# Patient Record
Sex: FEMALE | Race: WHITE | NOT HISPANIC OR LATINO | Employment: FULL TIME | URBAN - METROPOLITAN AREA
[De-identification: names, ages, dates, MRNs, and addresses within clinical notes are randomized per-mention and may not be internally consistent; named-entity substitution may affect disease eponyms.]

---

## 2017-11-13 DIAGNOSIS — Z12.31 VISIT FOR SCREENING MAMMOGRAM: Primary | ICD-10-CM

## 2017-11-13 DIAGNOSIS — Z78.0 MENOPAUSE: Primary | ICD-10-CM

## 2017-12-01 ENCOUNTER — HOSPITAL ENCOUNTER (OUTPATIENT)
Dept: RADIOLOGY | Facility: HOSPITAL | Age: 67
Discharge: HOME | End: 2017-12-01
Attending: FAMILY MEDICINE
Payer: MEDICARE

## 2017-12-01 DIAGNOSIS — Z78.0 MENOPAUSE: ICD-10-CM

## 2017-12-01 DIAGNOSIS — Z12.31 VISIT FOR SCREENING MAMMOGRAM: ICD-10-CM

## 2017-12-01 PROCEDURE — 77080 DXA BONE DENSITY AXIAL: CPT | Mod: TC,PO

## 2017-12-01 PROCEDURE — 77067 SCR MAMMO BI INCL CAD: CPT | Mod: TC,PO

## 2018-10-03 DIAGNOSIS — K74.60 CIRRHOSIS: Primary | ICD-10-CM

## 2018-10-08 ENCOUNTER — HOSPITAL ENCOUNTER (OUTPATIENT)
Dept: RADIOLOGY | Facility: HOSPITAL | Age: 68
Discharge: HOME | End: 2018-10-08
Attending: NURSE PRACTITIONER
Payer: MEDICARE

## 2018-10-08 DIAGNOSIS — K74.60 CIRRHOSIS: ICD-10-CM

## 2018-10-08 PROCEDURE — 76700 US EXAM ABDOM COMPLETE: CPT | Mod: TC,PO

## 2018-11-07 DIAGNOSIS — Z12.39 SCREENING BREAST EXAMINATION: Primary | ICD-10-CM

## 2018-12-03 ENCOUNTER — HOSPITAL ENCOUNTER (OUTPATIENT)
Dept: RADIOLOGY | Facility: HOSPITAL | Age: 68
Discharge: HOME | End: 2018-12-03
Attending: FAMILY MEDICINE
Payer: MEDICARE

## 2018-12-03 DIAGNOSIS — Z12.39 SCREENING BREAST EXAMINATION: ICD-10-CM

## 2018-12-03 PROCEDURE — 77063 BREAST TOMOSYNTHESIS BI: CPT | Mod: TC,PO

## 2018-12-03 PROCEDURE — 77067 SCR MAMMO BI INCL CAD: CPT | Mod: TC,PO

## 2019-04-03 DIAGNOSIS — K74.60 CIRRHOSIS: ICD-10-CM

## 2019-04-03 DIAGNOSIS — Z86.19 HISTORY OF HEPATITIS C: Primary | ICD-10-CM

## 2019-05-02 ENCOUNTER — HOSPITAL ENCOUNTER (OUTPATIENT)
Dept: RADIOLOGY | Facility: HOSPITAL | Age: 69
Discharge: HOME | End: 2019-05-02
Attending: NURSE PRACTITIONER
Payer: MEDICARE

## 2019-05-02 DIAGNOSIS — Z86.19 HISTORY OF HEPATITIS C: ICD-10-CM

## 2019-05-02 DIAGNOSIS — K74.60 CIRRHOSIS: ICD-10-CM

## 2019-05-02 PROCEDURE — 76700 US EXAM ABDOM COMPLETE: CPT | Mod: TC,PO

## 2019-12-04 DIAGNOSIS — Z12.31 SCREENING MAMMOGRAM, ENCOUNTER FOR: Primary | ICD-10-CM

## 2019-12-11 ENCOUNTER — HOSPITAL ENCOUNTER (OUTPATIENT)
Dept: RADIOLOGY | Facility: HOSPITAL | Age: 69
Discharge: HOME | End: 2019-12-11
Attending: FAMILY MEDICINE
Payer: MEDICARE

## 2019-12-11 DIAGNOSIS — Z12.31 SCREENING MAMMOGRAM, ENCOUNTER FOR: ICD-10-CM

## 2019-12-11 PROCEDURE — 77063 BREAST TOMOSYNTHESIS BI: CPT | Mod: TC,PO

## 2021-02-04 ENCOUNTER — HOSPITAL ENCOUNTER (OUTPATIENT)
Dept: RADIOLOGY | Facility: HOSPITAL | Age: 71
Discharge: HOME | End: 2021-02-04
Attending: FAMILY MEDICINE
Payer: MEDICARE

## 2021-02-04 DIAGNOSIS — Z12.31 SCREENING MAMMOGRAM, ENCOUNTER FOR: ICD-10-CM

## 2021-02-04 PROCEDURE — 77067 SCR MAMMO BI INCL CAD: CPT | Mod: TC,PO

## 2021-02-09 ENCOUNTER — HOSPITAL ENCOUNTER (OUTPATIENT)
Dept: RADIOLOGY | Facility: HOSPITAL | Age: 71
Discharge: HOME | End: 2021-02-09
Attending: INTERNAL MEDICINE
Payer: MEDICARE

## 2021-02-09 DIAGNOSIS — K74.60 LIVER CIRRHOSIS: ICD-10-CM

## 2021-02-09 PROCEDURE — 76700 US EXAM ABDOM COMPLETE: CPT | Mod: TC,PO

## 2021-03-04 ENCOUNTER — HOSPITAL ENCOUNTER (OUTPATIENT)
Dept: RADIOLOGY | Facility: HOSPITAL | Age: 71
Discharge: HOME | End: 2021-03-04
Attending: FAMILY MEDICINE
Payer: MEDICARE

## 2021-03-04 DIAGNOSIS — R41.82 ALTERED MENTAL STATUS, UNSPECIFIED: ICD-10-CM

## 2021-03-04 PROCEDURE — 70450 CT HEAD/BRAIN W/O DYE: CPT | Mod: TC,PO

## 2022-03-04 ENCOUNTER — LAB VISIT (OUTPATIENT)
Dept: LAB | Facility: HOSPITAL | Age: 72
End: 2022-03-04
Attending: INTERNAL MEDICINE
Payer: MEDICARE

## 2022-03-04 DIAGNOSIS — K74.60 CIRRHOSIS OF LIVER: Primary | ICD-10-CM

## 2022-03-04 LAB
AFP SERPL-MCNC: 4.3 NG/ML (ref 0–8.4)
ALBUMIN SERPL BCP-MCNC: 3.9 G/DL (ref 3.5–5.2)
ALP SERPL-CCNC: 106 U/L (ref 38–126)
ALT SERPL W/O P-5'-P-CCNC: 21 U/L (ref 10–44)
ANION GAP SERPL CALC-SCNC: 7 MMOL/L (ref 8–16)
AST SERPL-CCNC: 26 U/L (ref 15–46)
BASOPHILS # BLD AUTO: 0.02 K/UL (ref 0–0.2)
BASOPHILS NFR BLD: 0.6 % (ref 0–1.9)
BILIRUB SERPL-MCNC: 0.9 MG/DL (ref 0.1–1)
CALCIUM SERPL-MCNC: 9.4 MG/DL (ref 8.7–10.5)
CHLORIDE SERPL-SCNC: 108 MMOL/L (ref 95–110)
CO2 SERPL-SCNC: 28 MMOL/L (ref 23–29)
CREAT SERPL-MCNC: 1.06 MG/DL (ref 0.5–1.4)
DIFFERENTIAL METHOD: ABNORMAL
EOSINOPHIL # BLD AUTO: 0.1 K/UL (ref 0–0.5)
EOSINOPHIL NFR BLD: 3.1 % (ref 0–8)
ERYTHROCYTE [DISTWIDTH] IN BLOOD BY AUTOMATED COUNT: 12.7 % (ref 11.5–14.5)
EST. GFR  (AFRICAN AMERICAN): >60 ML/MIN/1.73 M^2
EST. GFR  (NON AFRICAN AMERICAN): 53.3 ML/MIN/1.73 M^2
GLUCOSE SERPL-MCNC: 82 MG/DL (ref 70–110)
HCT VFR BLD AUTO: 38 % (ref 37–48.5)
HGB BLD-MCNC: 12.3 G/DL (ref 12–16)
IMM GRANULOCYTES # BLD AUTO: 0.02 K/UL (ref 0–0.04)
IMM GRANULOCYTES NFR BLD AUTO: 0.6 % (ref 0–0.5)
INR PPP: 1.1 (ref 0.8–1.2)
LYMPHOCYTES # BLD AUTO: 0.7 K/UL (ref 1–4.8)
LYMPHOCYTES NFR BLD: 21.4 % (ref 18–48)
MCH RBC QN AUTO: 31.1 PG (ref 27–31)
MCHC RBC AUTO-ENTMCNC: 32.4 G/DL (ref 32–36)
MCV RBC AUTO: 96 FL (ref 82–98)
MONOCYTES # BLD AUTO: 0.2 K/UL (ref 0.3–1)
MONOCYTES NFR BLD: 7.3 % (ref 4–15)
NEUTROPHILS # BLD AUTO: 2.2 K/UL (ref 1.8–7.7)
NEUTROPHILS NFR BLD: 67 % (ref 38–73)
NRBC BLD-RTO: 0 /100 WBC
PLATELET # BLD AUTO: 108 K/UL (ref 150–450)
PMV BLD AUTO: 9.7 FL (ref 9.2–12.9)
POTASSIUM SERPL-SCNC: 4.9 MMOL/L (ref 3.5–5.1)
PROT SERPL-MCNC: 7.1 G/DL (ref 6–8.4)
PROTHROMBIN TIME: 11.3 SEC (ref 9–12.5)
RBC # BLD AUTO: 3.96 M/UL (ref 4–5.4)
SODIUM SERPL-SCNC: 143 MMOL/L (ref 136–145)
UUN UR-MCNC: 27 MG/DL (ref 7–17)
WBC # BLD AUTO: 3.27 K/UL (ref 3.9–12.7)

## 2022-03-04 PROCEDURE — 85025 COMPLETE CBC W/AUTO DIFF WBC: CPT | Mod: PO | Performed by: INTERNAL MEDICINE

## 2022-03-04 PROCEDURE — 82105 ALPHA-FETOPROTEIN SERUM: CPT | Mod: PO | Performed by: INTERNAL MEDICINE

## 2022-03-04 PROCEDURE — 85610 PROTHROMBIN TIME: CPT | Mod: PO | Performed by: INTERNAL MEDICINE

## 2022-03-04 PROCEDURE — 36415 COLL VENOUS BLD VENIPUNCTURE: CPT | Mod: PO | Performed by: INTERNAL MEDICINE

## 2022-03-04 PROCEDURE — 80053 COMPREHEN METABOLIC PANEL: CPT | Mod: PO | Performed by: INTERNAL MEDICINE

## 2022-03-21 ENCOUNTER — HOSPITAL ENCOUNTER (OUTPATIENT)
Dept: RADIOLOGY | Facility: HOSPITAL | Age: 72
Discharge: HOME | End: 2022-03-21
Attending: INTERNAL MEDICINE
Payer: MEDICARE

## 2022-03-21 DIAGNOSIS — K74.60 LIVER CIRRHOSIS: ICD-10-CM

## 2022-03-21 PROCEDURE — 76700 US EXAM ABDOM COMPLETE: CPT | Mod: TC,PO

## 2022-04-14 DIAGNOSIS — R55 SYNCOPE: Primary | ICD-10-CM

## 2022-04-20 ENCOUNTER — HOSPITAL ENCOUNTER (OUTPATIENT)
Dept: RADIOLOGY | Facility: HOSPITAL | Age: 72
Discharge: HOME | End: 2022-04-20
Attending: FAMILY MEDICINE
Payer: MEDICARE

## 2022-04-20 DIAGNOSIS — R55 SYNCOPE: ICD-10-CM

## 2022-04-20 PROCEDURE — 70450 CT HEAD/BRAIN W/O DYE: CPT | Mod: TC,PO

## 2022-04-20 PROCEDURE — 93880 EXTRACRANIAL BILAT STUDY: CPT | Mod: TC,PO

## 2022-06-09 ENCOUNTER — HOSPITAL ENCOUNTER (OUTPATIENT)
Dept: RADIOLOGY | Facility: HOSPITAL | Age: 72
Discharge: HOME | End: 2022-06-09
Attending: FAMILY MEDICINE
Payer: MEDICARE

## 2022-06-09 DIAGNOSIS — Z12.31 ENCOUNTER FOR SCREENING MAMMOGRAM FOR MALIGNANT NEOPLASM OF BREAST: ICD-10-CM

## 2022-06-09 PROCEDURE — 77067 SCR MAMMO BI INCL CAD: CPT | Mod: TC,PO

## 2022-07-25 ENCOUNTER — HOSPITAL ENCOUNTER (EMERGENCY)
Facility: HOSPITAL | Age: 72
Discharge: HOME | End: 2022-07-25
Attending: EMERGENCY MEDICINE
Payer: MEDICARE

## 2022-07-25 VITALS
TEMPERATURE: 99 F | RESPIRATION RATE: 18 BRPM | OXYGEN SATURATION: 98 % | DIASTOLIC BLOOD PRESSURE: 72 MMHG | SYSTOLIC BLOOD PRESSURE: 148 MMHG | BODY MASS INDEX: 35.85 KG/M2 | HEIGHT: 64 IN | HEART RATE: 52 BPM | WEIGHT: 210 LBS

## 2022-07-25 DIAGNOSIS — R00.0 TACHYCARDIA: ICD-10-CM

## 2022-07-25 DIAGNOSIS — D73.89 LESION OF SPLEEN: ICD-10-CM

## 2022-07-25 DIAGNOSIS — R16.1 SPLENOMEGALY, NOT ELSEWHERE CLASSIFIED: ICD-10-CM

## 2022-07-25 DIAGNOSIS — E86.0 DEHYDRATION: Primary | ICD-10-CM

## 2022-07-25 DIAGNOSIS — R10.12 LUQ ABDOMINAL PAIN: ICD-10-CM

## 2022-07-25 PROBLEM — E78.5 HYPERLIPIDEMIA: Status: ACTIVE | Noted: 2022-05-04

## 2022-07-25 PROBLEM — K74.60 CIRRHOSIS OF LIVER: Status: ACTIVE | Noted: 2021-01-02

## 2022-07-25 PROBLEM — N18.30 STAGE 3 CHRONIC KIDNEY DISEASE: Status: ACTIVE | Noted: 2021-01-02

## 2022-07-25 PROBLEM — E11.9 TYPE 2 DIABETES MELLITUS: Status: ACTIVE | Noted: 2021-01-02

## 2022-07-25 PROBLEM — I48.91 ATRIAL FIBRILLATION: Status: ACTIVE | Noted: 2022-05-04

## 2022-07-25 PROBLEM — I10 ESSENTIAL HYPERTENSION: Status: ACTIVE | Noted: 2021-01-02

## 2022-07-25 LAB
ALBUMIN SERPL BCP-MCNC: 4.1 G/DL (ref 3.5–5.2)
ALLENS TEST: ABNORMAL
ALP SERPL-CCNC: 87 U/L (ref 38–126)
ALT SERPL W/O P-5'-P-CCNC: 19 U/L (ref 10–44)
ANION GAP SERPL CALC-SCNC: 7 MMOL/L (ref 8–16)
AST SERPL-CCNC: 29 U/L (ref 15–46)
B-OH-BUTYR BLD STRIP-SCNC: 0.6 MMOL/L (ref 0–0.5)
BASOPHILS # BLD AUTO: 0.01 K/UL (ref 0–0.2)
BASOPHILS NFR BLD: 0.2 % (ref 0–1.9)
BILIRUB SERPL-MCNC: 2.1 MG/DL (ref 0.1–1)
BILIRUB UR QL STRIP: ABNORMAL
CALCIUM SERPL-MCNC: 9 MG/DL (ref 8.7–10.5)
CHLORIDE SERPL-SCNC: 105 MMOL/L (ref 95–110)
CLARITY UR REFRACT.AUTO: ABNORMAL
CO2 SERPL-SCNC: 23 MMOL/L (ref 23–29)
COLOR UR AUTO: ABNORMAL
CREAT SERPL-MCNC: 1.23 MG/DL (ref 0.5–1.4)
DELSYS: ABNORMAL
DIFFERENTIAL METHOD: ABNORMAL
EOSINOPHIL # BLD AUTO: 0.1 K/UL (ref 0–0.5)
EOSINOPHIL NFR BLD: 1 % (ref 0–8)
ERYTHROCYTE [DISTWIDTH] IN BLOOD BY AUTOMATED COUNT: 12.5 % (ref 11.5–14.5)
EST. GFR  (AFRICAN AMERICAN): 51 ML/MIN/1.73 M^2
EST. GFR  (NON AFRICAN AMERICAN): 44.2 ML/MIN/1.73 M^2
GLUCOSE SERPL-MCNC: 169 MG/DL (ref 70–110)
GLUCOSE UR QL STRIP: NEGATIVE
HCO3 UR-SCNC: 25.2 MMOL/L (ref 24–28)
HCT VFR BLD AUTO: 37.5 % (ref 37–48.5)
HCT VFR BLD CALC: 36 %PCV (ref 36–54)
HGB BLD-MCNC: 12 G/DL
HGB BLD-MCNC: 12.5 G/DL (ref 12–16)
HGB UR QL STRIP: ABNORMAL
IMM GRANULOCYTES # BLD AUTO: 0.02 K/UL (ref 0–0.04)
IMM GRANULOCYTES NFR BLD AUTO: 0.3 % (ref 0–0.5)
KETONES UR QL STRIP: ABNORMAL
LEUKOCYTE ESTERASE UR QL STRIP: NEGATIVE
LIPASE SERPL-CCNC: 62 U/L (ref 23–300)
LYMPHOCYTES # BLD AUTO: 0.8 K/UL (ref 1–4.8)
LYMPHOCYTES NFR BLD: 14 % (ref 18–48)
MCH RBC QN AUTO: 31.6 PG (ref 27–31)
MCHC RBC AUTO-ENTMCNC: 33.3 G/DL (ref 32–36)
MCV RBC AUTO: 95 FL (ref 82–98)
MONOCYTES # BLD AUTO: 0.6 K/UL (ref 0.3–1)
MONOCYTES NFR BLD: 9.6 % (ref 4–15)
NEUTROPHILS # BLD AUTO: 4.3 K/UL (ref 1.8–7.7)
NEUTROPHILS NFR BLD: 74.9 % (ref 38–73)
NITRITE UR QL STRIP: NEGATIVE
NRBC BLD-RTO: 0 /100 WBC
PCO2 BLDA: 46 MMHG (ref 35–45)
PH SMN: 7.35 [PH] (ref 7.35–7.45)
PH UR STRIP: 6 [PH] (ref 5–8)
PLATELET # BLD AUTO: 86 K/UL (ref 150–450)
PMV BLD AUTO: 10.4 FL (ref 9.2–12.9)
PO2 BLDA: 19 MMHG (ref 40–60)
POC BE: 0 MMOL/L
POC SATURATED O2: 27 % (ref 95–100)
POC TCO2: 27 MMOL/L (ref 24–29)
POCT GLUCOSE: 163 MG/DL (ref 70–110)
POTASSIUM BLD-SCNC: 4.4 MMOL/L (ref 3.5–5.1)
POTASSIUM SERPL-SCNC: 5.4 MMOL/L (ref 3.5–5.1)
PROT SERPL-MCNC: 7.2 G/DL (ref 6–8.4)
PROT UR QL STRIP: ABNORMAL
RBC # BLD AUTO: 3.96 M/UL (ref 4–5.4)
SAMPLE: ABNORMAL
SITE: ABNORMAL
SODIUM BLD-SCNC: 139 MMOL/L (ref 136–145)
SODIUM SERPL-SCNC: 135 MMOL/L (ref 136–145)
SP GR UR STRIP: 1.02 (ref 1–1.03)
URN SPEC COLLECT METH UR: ABNORMAL
UROBILINOGEN UR STRIP-ACNC: >=8 EU/DL
UUN UR-MCNC: 28 MG/DL (ref 7–17)
WBC # BLD AUTO: 5.73 K/UL (ref 3.9–12.7)

## 2022-07-25 PROCEDURE — 85025 COMPLETE CBC W/AUTO DIFF WBC: CPT | Mod: ER | Performed by: NURSE PRACTITIONER

## 2022-07-25 PROCEDURE — 84295 ASSAY OF SERUM SODIUM: CPT | Mod: ER,91

## 2022-07-25 PROCEDURE — 96374 THER/PROPH/DIAG INJ IV PUSH: CPT | Mod: ER,59

## 2022-07-25 PROCEDURE — 63600175 PHARM REV CODE 636 W HCPCS: Mod: ER | Performed by: NURSE PRACTITIONER

## 2022-07-25 PROCEDURE — 82962 GLUCOSE BLOOD TEST: CPT | Mod: ER

## 2022-07-25 PROCEDURE — 80053 COMPREHEN METABOLIC PANEL: CPT | Mod: ER | Performed by: NURSE PRACTITIONER

## 2022-07-25 PROCEDURE — 99900035 HC TECH TIME PER 15 MIN (STAT): Mod: ER

## 2022-07-25 PROCEDURE — 93010 EKG 12-LEAD: ICD-10-PCS | Mod: ,,, | Performed by: INTERNAL MEDICINE

## 2022-07-25 PROCEDURE — 25500020 PHARM REV CODE 255: Mod: ER | Performed by: EMERGENCY MEDICINE

## 2022-07-25 PROCEDURE — 96361 HYDRATE IV INFUSION ADD-ON: CPT | Mod: ER

## 2022-07-25 PROCEDURE — 82330 ASSAY OF CALCIUM: CPT | Mod: ER

## 2022-07-25 PROCEDURE — 85014 HEMATOCRIT: CPT | Mod: ER,91

## 2022-07-25 PROCEDURE — 81003 URINALYSIS AUTO W/O SCOPE: CPT | Mod: ER | Performed by: NURSE PRACTITIONER

## 2022-07-25 PROCEDURE — 82010 KETONE BODYS QUAN: CPT | Mod: ER | Performed by: NURSE PRACTITIONER

## 2022-07-25 PROCEDURE — 84132 ASSAY OF SERUM POTASSIUM: CPT | Mod: ER

## 2022-07-25 PROCEDURE — 99285 EMERGENCY DEPT VISIT HI MDM: CPT | Mod: 25,ER

## 2022-07-25 PROCEDURE — 83690 ASSAY OF LIPASE: CPT | Mod: ER | Performed by: NURSE PRACTITIONER

## 2022-07-25 PROCEDURE — 25000003 PHARM REV CODE 250: Mod: ER | Performed by: NURSE PRACTITIONER

## 2022-07-25 PROCEDURE — 93010 ELECTROCARDIOGRAM REPORT: CPT | Mod: ,,, | Performed by: INTERNAL MEDICINE

## 2022-07-25 PROCEDURE — 93005 ELECTROCARDIOGRAM TRACING: CPT | Mod: ER

## 2022-07-25 RX ORDER — KETOROLAC TROMETHAMINE 30 MG/ML
10 INJECTION, SOLUTION INTRAMUSCULAR; INTRAVENOUS
Status: COMPLETED | OUTPATIENT
Start: 2022-07-25 | End: 2022-07-25

## 2022-07-25 RX ADMIN — SODIUM CHLORIDE 1000 ML: 0.9 INJECTION, SOLUTION INTRAVENOUS at 10:07

## 2022-07-25 RX ADMIN — IOHEXOL 100 ML: 350 INJECTION, SOLUTION INTRAVENOUS at 11:07

## 2022-07-25 RX ADMIN — KETOROLAC TROMETHAMINE 10 MG: 30 INJECTION, SOLUTION INTRAMUSCULAR; INTRAVENOUS at 11:07

## 2022-07-25 NOTE — DISCHARGE INSTRUCTIONS
Please have your primary care provider follow up on your lung nodule- the radiologist recommended a repeat CT in 4 months. Please increase your water intake as you were slightly dehydrated.

## 2022-07-25 NOTE — ED PROVIDER NOTES
Encounter Date: 7/25/2022       History     Chief Complaint   Patient presents with    Abdominal Pain     Pt reports pain to the lower left abdominal area. Pt denies nvd, pt denies pain or burning on urination. Pt states she has dark colored urine and a sweet scent to the urine. Pt states she is diabetic and her CBG was 120 this morning.      73 y/o female with afib, liver cirrhosis, HTN, DM, which presents to the ED with intermittent LUQ abdominal pain that began yesterday. Denies fever, N/V/D or any other symptoms.     The history is provided by the patient.     Review of patient's allergies indicates:   Allergen Reactions    Codeine Swelling    Penicillins Rash     History reviewed. No pertinent past medical history.  Past Surgical History:   Procedure Laterality Date    HYSTERECTOMY      OOPHORECTOMY       Family History   Problem Relation Age of Onset    Breast cancer Sister         Review of Systems   Constitutional: Negative for fever.   HENT: Negative for sore throat.    Respiratory: Negative for shortness of breath.    Cardiovascular: Negative for chest pain.   Gastrointestinal: Positive for abdominal pain. Negative for nausea.   Genitourinary: Negative for dysuria.   Musculoskeletal: Negative for back pain.   Skin: Negative for rash.   Neurological: Negative for weakness.   Hematological: Does not bruise/bleed easily.   All other systems reviewed and are negative.    Physical Exam     Initial Vitals [07/25/22 1011]   BP Pulse Resp Temp SpO2   (!) 137/95 (!) 115 18 99.2 °F (37.3 °C) 98 %      MAP       --         Physical Exam    Nursing note and vitals reviewed.  Constitutional: She appears well-developed and well-nourished. She is Obese .   HENT:   Head: Normocephalic and atraumatic.   Eyes: Conjunctivae and EOM are normal. Pupils are equal, round, and reactive to light.   Neck:   Normal range of motion.  Cardiovascular: Regular rhythm, normal heart sounds and intact distal pulses. Tachycardia  present.  Exam reveals no gallop and no friction rub.    No murmur heard.  Pulmonary/Chest: Breath sounds normal. No respiratory distress. She has no wheezes. She has no rhonchi. She has no rales. She exhibits no tenderness.   Abdominal: Abdomen is soft. Bowel sounds are normal. She exhibits no distension and no mass. There is abdominal tenderness. There is no rebound and no guarding.   Musculoskeletal:         General: No tenderness or edema. Normal range of motion.      Cervical back: Normal range of motion.     Neurological: She is alert and oriented to person, place, and time. She has normal strength. GCS score is 15. GCS eye subscore is 4. GCS verbal subscore is 5. GCS motor subscore is 6.   Skin: Skin is warm. Capillary refill takes less than 2 seconds.       ED Course   Procedures  Labs Reviewed   CBC W/ AUTO DIFFERENTIAL - Abnormal; Notable for the following components:       Result Value    RBC 3.96 (*)     MCH 31.6 (*)     Platelets 86 (*)     Lymph # 0.8 (*)     Gran % 74.9 (*)     Lymph % 14.0 (*)     All other components within normal limits   COMPREHENSIVE METABOLIC PANEL - Abnormal; Notable for the following components:    Sodium 135 (*)     Potassium 5.4 (*)     Glucose 169 (*)     BUN 28 (*)     Total Bilirubin 2.1 (*)     Anion Gap 7 (*)     eGFR if  51.0 (*)     eGFR if non  44.2 (*)     All other components within normal limits   URINALYSIS, REFLEX TO URINE CULTURE - Abnormal; Notable for the following components:    Color, UA Orange (*)     Appearance, UA Hazy (*)     Protein, UA Trace (*)     Ketones, UA Trace (*)     Bilirubin (UA) 1+ (*)     Occult Blood UA Trace (*)     Urobilinogen, UA >=8.0 (*)     All other components within normal limits    Narrative:     Preferred Collection Type->Urine, Clean Catch  Specimen Source->Urine  Collection Type->Urine, Clean Catch   BETA - HYDROXYBUTYRATE, SERUM - Abnormal; Notable for the following components:     Beta-Hydroxybutyrate 0.6 (*)     All other components within normal limits   POCT GLUCOSE - Abnormal; Notable for the following components:    POCT Glucose 163 (*)     All other components within normal limits   ISTAT PROCEDURE - Abnormal; Notable for the following components:    POC PH 7.346 (*)     POC PCO2 46.0 (*)     POC PO2 19 (*)     POC SATURATED O2 27 (*)     All other components within normal limits   LIPASE        ECG Results          EKG 12-lead (In process)  Result time 07/25/22 10:55:52    In process by Interface, Lab In [facility] (07/25/22 10:55:52)                 Narrative:    Test Reason : R00.0,    Vent. Rate : 112 BPM     Atrial Rate : 144 BPM     P-R Int : 000 ms          QRS Dur : 094 ms      QT Int : 348 ms       P-R-T Axes : 000 014 056 degrees     QTc Int : 475 ms    Atrial fibrillation with rapid ventricular response  Low voltage QRS  Abnormal ECG  No previous ECGs available    Referred By: [email or web address]   SELF           Confirmed By:                             Imaging Results          CT Abdomen Pelvis With Contrast (Final result)  Result time 07/25/22 12:16:49    Final result by NADEEM Vicente Sr., MD (07/25/22 12:16:49)                 Impression:      1. There is a 71 mm area of hypodensity in the anterior inferior aspect of the spleen.  There are mild inflammatory changes adjacent to the inferior aspect of the spleen.  Hence common infectious process cannot be excluded.  If additional imaging evaluation is clinically indicated, I recommend consideration of an MRI examination of the abdomen without and with IV contrast.  2. There is a 29 mm stone in the dependent portion of the gallbladder.  3. The liver has a nodular surface.  This is characteristic of hepatic cirrhosis.  4. There is an 8 mm noncalcified pulmonary nodule in the left lower lobe.  I recommend consideration of a follow-up CT examination of the thorax without IV contrast in 4 months.  5. There is a marked amount of  diverticulosis throughout the majority of the colon. There are findings characteristic of a moderate size diverticulum off of the left side of the 2nd portion of the duodenum.  There are findings characteristic of a small diverticulum off of the superior aspect of the 3rd portion of the duodenum.  All CT scans at this facility use dose modulation, iterative reconstruction, and/or weight base dosing when appropriate to reduce radiation dose when appropriate to reduce radiation dose to as low as reasonably achievable.      Electronically signed by: Jered Vicente MD  Date:    07/25/2022  Time:    12:16             Narrative:    EXAMINATION:  CT ABDOMEN PELVIS WITH CONTRAST    CLINICAL HISTORY:  LLQ abdominal pain;    TECHNIQUE:  Standard abdomen and pelvis CT protocol with IV contrast was performed.  100 mL of Omnipaque 350 contrast material was used for this examination.  There was no oral contrast administered.    COMPARISON:  An ultrasound examination of the abdomen performed on 03/21/2022.    FINDINGS:  Finding: The size of the heart is within normal limits.  There is an 8 mm noncalcified pulmonary nodule in the left lower lobe.  There are mild dependent atelectatic changes in both lungs.  There is no pneumothorax or pleural effusion.    The liver has a nodular surface.  There is a 29 mm stone in the dependent portion of the gallbladder.  There is moderate generalized atrophy of the pancreas.  There is a 71 mm area of hypodensity in the anterior inferior aspect of the spleen.  There are mild inflammatory changes adjacent to the inferior aspect of the spleen.  The adrenals and kidneys are normal in appearance. The ureters and the urinary bladder are normal in appearance.  The uterus is absent.  The appendix is normal in appearance.  There is a marked amount of diverticulosis throughout the majority of the colon.  There are findings characteristic of a moderate size diverticulum off of the left side of the 2nd  portion of the duodenum.  There are findings characteristic of a small diverticulum off of the superior aspect of the 3rd portion of the duodenum.  There is a tiny amount of free fluid within the pelvis.  There is no pneumoperitoneum.  There is a moderate amount of atherosclerosis.                                 Medications   sodium chloride 0.9% bolus 1,000 mL (0 mLs Intravenous Stopped 7/25/22 1155)   ketorolac injection 9.999 mg (9.999 mg Intravenous Given 7/25/22 1105)   iohexoL (OMNIPAQUE 350) injection 100 mL (100 mLs Intravenous Given 7/25/22 1150)     Medical Decision Making:   Initial Assessment:   71 y/o female which presents with LUQ abdominal pain that is sharp in nature and is intermittent. PT states the pain comes in waves and then goes away.   Differential Diagnosis:   Gastroenteritis, gastritis, ulcer, cholecystitis, gallstones, pancreatitis, ileus, small bowel obstruction, appendicitis, constipation  Clinical Tests:   Lab Tests: Reviewed and Ordered  The following lab test(s) were unremarkable: CBC and Lipase       <> Summary of Lab: Elevated bili- hx of cirrhosis  Radiological Study: Ordered and Reviewed  Medical Tests: Ordered and Reviewed  ED Management:  Pt examined and noted to have LUQ abdominal pain. Labs are unremarkable except for elevated bili but she has a hx of liver cirrhosis and does not have any RUQ abdominal pain. Dr. Ham with hospital medicine called and she did not feel that the CT results warranted admission. The patient did not have any other reason to be admitted. She was dehydrated on presentation and was given fluids which made her feel better. MRI abdomen ordered as an outpatient and the patient has been advised to follow up with her PCP. I have stress that if she begins to have N/V or worsening pain to please return immediately. Patient given strict return precautions and voiced understanding of all discharge instructions. Pt was stable at discharge.                  ED  Course as of 07/25/22 1614   Mon Jul 25, 2022   1019 BP(!): 137/95 [AT]   1019 Temp: 99.2 °F (37.3 °C) [AT]   1019 Temp src: Oral [AT]   1019 Pulse(!): 115 [AT]   1019 SpO2: 98 % [AT]   1019 Resp: 18 [AT]   1146 Beta-Hydroxybutyrate(!): 0.6 [AT]   1146 Sodium(!): 135 [AT]   1146 Glucose(!): 169 [AT]   1146 BUN(!): 28 [AT]   1146 BILIRUBIN TOTAL(!): 2.1 [AT]   1252 Spoke with Dr. Ham and she feels the patient can be followed up as outpatient [AT]   1321 POC Potassium: 4.4 [AT]   1321 POC PH(!): 7.346 [AT]      ED Course User Index  [AT] LUI Valencia             Clinical Impression:   Final diagnoses:  [R00.0] Tachycardia  [E86.0] Dehydration (Primary)  [R10.12] LUQ abdominal pain  [D73.89] Lesion of spleen  [R16.1] Splenomegaly, not elsewhere classified          ED Disposition Condition    Discharge Stable        ED Prescriptions     None        Follow-up Information     Follow up With Specialties Details Why Contact Info    Nahum Hull MD Family Medicine Schedule an appointment as soon as possible for a visit   [address] [facility]  [address]  [place] [address]  [phone]             LUI Valencia  07/25/22 1614

## 2022-07-26 ENCOUNTER — HOSPITAL ENCOUNTER (OUTPATIENT)
Dept: RADIOLOGY | Facility: HOSPITAL | Age: 72
Discharge: HOME | End: 2022-07-26
Attending: NURSE PRACTITIONER
Payer: MEDICARE

## 2022-07-26 ENCOUNTER — HOSPITAL ENCOUNTER (OUTPATIENT)
Facility: HOSPITAL | Age: 72
Discharge: HOME | End: 2022-07-27
Attending: EMERGENCY MEDICINE | Admitting: FAMILY MEDICINE
Payer: MEDICARE

## 2022-07-26 DIAGNOSIS — D73.89 LESION OF SPLEEN: ICD-10-CM

## 2022-07-26 DIAGNOSIS — S35.299A: ICD-10-CM

## 2022-07-26 DIAGNOSIS — I48.91 ATRIAL FIBRILLATION, UNSPECIFIED TYPE: ICD-10-CM

## 2022-07-26 DIAGNOSIS — I48.91 A-FIB: ICD-10-CM

## 2022-07-26 DIAGNOSIS — R16.1 SPLENOMEGALY, NOT ELSEWHERE CLASSIFIED: ICD-10-CM

## 2022-07-26 DIAGNOSIS — D73.5 SPLENIC INFARCT: Primary | ICD-10-CM

## 2022-07-26 PROBLEM — F41.9 ANXIETY: Status: ACTIVE | Noted: 2022-07-26

## 2022-07-26 PROBLEM — B19.20 HEPATITIS C: Status: ACTIVE | Noted: 2022-07-26

## 2022-07-26 LAB
ALBUMIN SERPL BCP-MCNC: 3.2 G/DL (ref 3.5–5.2)
ALP SERPL-CCNC: 103 U/L (ref 55–135)
ALT SERPL W/O P-5'-P-CCNC: 15 U/L (ref 10–44)
ANION GAP SERPL CALC-SCNC: 12 MMOL/L (ref 8–16)
AORTIC ROOT ANNULUS: 3.25 CM
AORTIC VALVE CUSP SEPERATION: 1.72 CM
AST SERPL-CCNC: 20 U/L (ref 10–40)
AV INDEX (PROSTH): 0.62
AV MEAN GRADIENT: 5 MMHG
AV PEAK GRADIENT: 8 MMHG
AV VALVE AREA: 1.76 CM2
AV VELOCITY RATIO: 0.63
BASOPHILS # BLD AUTO: 0.01 K/UL (ref 0–0.2)
BASOPHILS NFR BLD: 0.2 % (ref 0–1.9)
BILIRUB SERPL-MCNC: 1.3 MG/DL (ref 0.1–1)
BSA FOR ECHO PROCEDURE: 2.07 M2
BUN SERPL-MCNC: 29 MG/DL (ref 8–23)
CALCIUM SERPL-MCNC: 9.3 MG/DL (ref 8.7–10.5)
CHLORIDE SERPL-SCNC: 106 MMOL/L (ref 95–110)
CO2 SERPL-SCNC: 19 MMOL/L (ref 23–29)
CREAT SERPL-MCNC: 1.4 MG/DL (ref 0.5–1.4)
CTP QC/QA: YES
CV ECHO LV RWT: 0.47 CM
DIFFERENTIAL METHOD: ABNORMAL
DOP CALC AO PEAK VEL: 1.39 M/S
DOP CALC AO VTI: 29.22 CM
DOP CALC LVOT AREA: 2.8 CM2
DOP CALC LVOT DIAMETER: 1.9 CM
DOP CALC LVOT PEAK VEL: 0.87 M/S
DOP CALC LVOT STROKE VOLUME: 51.52 CM3
DOP CALC MV VTI: 26.12 CM
DOP CALCLVOT PEAK VEL VTI: 18.18 CM
ECHO LV POSTERIOR WALL: 1.1 CM (ref 0.6–1.1)
EJECTION FRACTION: 50 %
EOSINOPHIL # BLD AUTO: 0.1 K/UL (ref 0–0.5)
EOSINOPHIL NFR BLD: 1.5 % (ref 0–8)
ERYTHROCYTE [DISTWIDTH] IN BLOOD BY AUTOMATED COUNT: 12.6 % (ref 11.5–14.5)
EST. GFR  (AFRICAN AMERICAN): 44 ML/MIN/1.73 M^2
EST. GFR  (NON AFRICAN AMERICAN): 38 ML/MIN/1.73 M^2
ESTIMATED AVG GLUCOSE: 189 MG/DL (ref 68–131)
FRACTIONAL SHORTENING: 35 % (ref 28–44)
GLUCOSE SERPL-MCNC: 306 MG/DL (ref 70–110)
HBA1C MFR BLD: 8.2 % (ref 4–5.6)
HCT VFR BLD AUTO: 34.2 % (ref 37–48.5)
HGB BLD-MCNC: 11.6 G/DL (ref 12–16)
IMM GRANULOCYTES # BLD AUTO: 0.01 K/UL (ref 0–0.04)
IMM GRANULOCYTES NFR BLD AUTO: 0.2 % (ref 0–0.5)
INTERVENTRICULAR SEPTUM: 1.12 CM (ref 0.6–1.1)
IVRT: 85.63 MSEC
LA MAJOR: 4.93 CM
LA MINOR: 5.08 CM
LA WIDTH: 3.69 CM
LEFT ATRIUM SIZE: 3.49 CM
LEFT ATRIUM VOLUME INDEX MOD: 18.6 ML/M2
LEFT ATRIUM VOLUME INDEX: 27.4 ML/M2
LEFT ATRIUM VOLUME MOD: 37.25 CM3
LEFT ATRIUM VOLUME: 54.77 CM3
LEFT INTERNAL DIMENSION IN SYSTOLE: 3.03 CM (ref 2.1–4)
LEFT VENTRICLE DIASTOLIC VOLUME INDEX: 49.78 ML/M2
LEFT VENTRICLE DIASTOLIC VOLUME: 99.56 ML
LEFT VENTRICLE MASS INDEX: 93 G/M2
LEFT VENTRICLE SYSTOLIC VOLUME INDEX: 17.9 ML/M2
LEFT VENTRICLE SYSTOLIC VOLUME: 35.86 ML
LEFT VENTRICULAR INTERNAL DIMENSION IN DIASTOLE: 4.64 CM (ref 3.5–6)
LEFT VENTRICULAR MASS: 186.08 G
LYMPHOCYTES # BLD AUTO: 0.6 K/UL (ref 1–4.8)
LYMPHOCYTES NFR BLD: 12.9 % (ref 18–48)
MCH RBC QN AUTO: 31.5 PG (ref 27–31)
MCHC RBC AUTO-ENTMCNC: 33.9 G/DL (ref 32–36)
MCV RBC AUTO: 93 FL (ref 82–98)
MONOCYTES # BLD AUTO: 0.4 K/UL (ref 0.3–1)
MONOCYTES NFR BLD: 8.8 % (ref 4–15)
MV MEAN GRADIENT: 1 MMHG
MV PEAK GRADIENT: 6 MMHG
MV VALVE AREA BY CONTINUITY EQUATION: 1.97 CM2
NEUTROPHILS # BLD AUTO: 3.5 K/UL (ref 1.8–7.7)
NEUTROPHILS NFR BLD: 76.4 % (ref 38–73)
NRBC BLD-RTO: 0 /100 WBC
PISA TR MAX VEL: 2.05 M/S
PLATELET # BLD AUTO: 92 K/UL (ref 150–450)
PMV BLD AUTO: 10.2 FL (ref 9.2–12.9)
POCT GLUCOSE: 130 MG/DL (ref 70–110)
POCT GLUCOSE: 159 MG/DL (ref 70–110)
POCT GLUCOSE: 201 MG/DL (ref 70–110)
POTASSIUM SERPL-SCNC: 4.4 MMOL/L (ref 3.5–5.1)
PROT SERPL-MCNC: 7 G/DL (ref 6–8.4)
PV PEAK VELOCITY: 0.94 CM/S
RA MAJOR: 5.39 CM
RA PRESSURE: 3 MMHG
RA WIDTH: 3.28 CM
RBC # BLD AUTO: 3.68 M/UL (ref 4–5.4)
RIGHT VENTRICULAR END-DIASTOLIC DIMENSION: 2.27 CM
SARS-COV-2 RDRP RESP QL NAA+PROBE: NEGATIVE
SODIUM SERPL-SCNC: 137 MMOL/L (ref 136–145)
TDI LATERAL: 0.05 M/S
TDI SEPTAL: 0.07 M/S
TDI: 0.06 M/S
TR MAX PG: 17 MMHG
TSH SERPL DL<=0.005 MIU/L-ACNC: 2.56 UIU/ML (ref 0.4–4)
TV REST PULMONARY ARTERY PRESSURE: 20 MMHG
WBC # BLD AUTO: 4.64 K/UL (ref 3.9–12.7)

## 2022-07-26 PROCEDURE — 84443 ASSAY THYROID STIM HORMONE: CPT | Performed by: FAMILY MEDICINE

## 2022-07-26 PROCEDURE — 25000003 PHARM REV CODE 250: Performed by: STUDENT IN AN ORGANIZED HEALTH CARE EDUCATION/TRAINING PROGRAM

## 2022-07-26 PROCEDURE — 96374 THER/PROPH/DIAG INJ IV PUSH: CPT

## 2022-07-26 PROCEDURE — A9585 GADOBUTROL INJECTION: HCPCS | Mod: PO,ER | Performed by: NURSE PRACTITIONER

## 2022-07-26 PROCEDURE — 83036 HEMOGLOBIN GLYCOSYLATED A1C: CPT | Performed by: FAMILY MEDICINE

## 2022-07-26 PROCEDURE — 93005 ELECTROCARDIOGRAM TRACING: CPT

## 2022-07-26 PROCEDURE — 93010 EKG 12-LEAD: ICD-10-PCS | Mod: ,,, | Performed by: INTERNAL MEDICINE

## 2022-07-26 PROCEDURE — G0378 HOSPITAL OBSERVATION PER HR: HCPCS

## 2022-07-26 PROCEDURE — U0002 COVID-19 LAB TEST NON-CDC: HCPCS | Performed by: STUDENT IN AN ORGANIZED HEALTH CARE EDUCATION/TRAINING PROGRAM

## 2022-07-26 PROCEDURE — 74183 MRI ABD W/O CNTR FLWD CNTR: CPT | Mod: TC,PO

## 2022-07-26 PROCEDURE — 93010 ELECTROCARDIOGRAM REPORT: CPT | Mod: ,,, | Performed by: INTERNAL MEDICINE

## 2022-07-26 PROCEDURE — 99285 EMERGENCY DEPT VISIT HI MDM: CPT | Mod: 25

## 2022-07-26 PROCEDURE — 80053 COMPREHEN METABOLIC PANEL: CPT | Performed by: FAMILY MEDICINE

## 2022-07-26 PROCEDURE — 96372 THER/PROPH/DIAG INJ SC/IM: CPT | Mod: 59 | Performed by: STUDENT IN AN ORGANIZED HEALTH CARE EDUCATION/TRAINING PROGRAM

## 2022-07-26 PROCEDURE — 63600175 PHARM REV CODE 636 W HCPCS: Performed by: STUDENT IN AN ORGANIZED HEALTH CARE EDUCATION/TRAINING PROGRAM

## 2022-07-26 PROCEDURE — C9399 UNCLASSIFIED DRUGS OR BIOLOG: HCPCS | Performed by: STUDENT IN AN ORGANIZED HEALTH CARE EDUCATION/TRAINING PROGRAM

## 2022-07-26 PROCEDURE — 25500020 PHARM REV CODE 255: Mod: PO,ER | Performed by: NURSE PRACTITIONER

## 2022-07-26 PROCEDURE — 85025 COMPLETE CBC W/AUTO DIFF WBC: CPT | Performed by: FAMILY MEDICINE

## 2022-07-26 RX ORDER — FERROUS SULFATE 324(65)MG
324 TABLET, DELAYED RELEASE (ENTERIC COATED) ORAL DAILY
COMMUNITY

## 2022-07-26 RX ORDER — FLUOROMETHOLONE 1 MG/ML
1 SUSPENSION/ DROPS OPHTHALMIC DAILY
COMMUNITY
Start: 2022-04-22

## 2022-07-26 RX ORDER — POLYETHYLENE GLYCOL 3350 17 G/17G
17 POWDER, FOR SOLUTION ORAL DAILY
Status: DISCONTINUED | OUTPATIENT
Start: 2022-07-27 | End: 2022-07-27 | Stop reason: HOSPADM

## 2022-07-26 RX ORDER — GLIPIZIDE 5 MG/1
5 TABLET ORAL DAILY
COMMUNITY
Start: 2022-03-18

## 2022-07-26 RX ORDER — INSULIN ASPART 100 [IU]/ML
1-10 INJECTION, SOLUTION INTRAVENOUS; SUBCUTANEOUS
Status: DISCONTINUED | OUTPATIENT
Start: 2022-07-26 | End: 2022-07-27 | Stop reason: HOSPADM

## 2022-07-26 RX ORDER — ONDANSETRON 8 MG/1
8 TABLET, ORALLY DISINTEGRATING ORAL EVERY 8 HOURS PRN
Status: DISCONTINUED | OUTPATIENT
Start: 2022-07-26 | End: 2022-07-27 | Stop reason: HOSPADM

## 2022-07-26 RX ORDER — CANAGLIFLOZIN AND METFORMIN HYDROCHLORIDE 150; 1000 MG/1; MG/1
TABLET, FILM COATED ORAL
COMMUNITY
End: 2022-07-26

## 2022-07-26 RX ORDER — PHENYLEPHRINE HCL 10 MG
1000 TABLET ORAL DAILY
COMMUNITY

## 2022-07-26 RX ORDER — DICLOFENAC SODIUM 75 MG/1
TABLET, DELAYED RELEASE ORAL
COMMUNITY
End: 2022-07-26

## 2022-07-26 RX ORDER — LIDOCAINE 50 MG/G
1 PATCH TOPICAL DAILY PRN
Status: DISCONTINUED | OUTPATIENT
Start: 2022-07-26 | End: 2022-07-27 | Stop reason: HOSPADM

## 2022-07-26 RX ORDER — GADOBUTROL 604.72 MG/ML
10 INJECTION INTRAVENOUS
Status: COMPLETED | OUTPATIENT
Start: 2022-07-26 | End: 2022-07-26

## 2022-07-26 RX ORDER — KETOROLAC TROMETHAMINE 30 MG/ML
10 INJECTION, SOLUTION INTRAMUSCULAR; INTRAVENOUS
Status: COMPLETED | OUTPATIENT
Start: 2022-07-26 | End: 2022-07-26

## 2022-07-26 RX ORDER — SPIRONOLACTONE 25 MG/1
50 TABLET ORAL DAILY
Status: DISCONTINUED | OUTPATIENT
Start: 2022-07-27 | End: 2022-07-27 | Stop reason: HOSPADM

## 2022-07-26 RX ORDER — INSULIN ASPART 100 [IU]/ML
1-10 INJECTION, SOLUTION INTRAVENOUS; SUBCUTANEOUS
Status: DISCONTINUED | OUTPATIENT
Start: 2022-07-26 | End: 2022-07-26

## 2022-07-26 RX ORDER — CITALOPRAM 40 MG/1
40 TABLET, FILM COATED ORAL DAILY
COMMUNITY
Start: 2022-05-14

## 2022-07-26 RX ORDER — IRBESARTAN 150 MG/1
TABLET ORAL
COMMUNITY
End: 2022-07-26

## 2022-07-26 RX ORDER — METOPROLOL SUCCINATE 25 MG/1
25 TABLET, EXTENDED RELEASE ORAL DAILY PRN
COMMUNITY
Start: 2022-05-16

## 2022-07-26 RX ORDER — CALCIUM CARBONATE 500(1250)
1 TABLET ORAL DAILY
COMMUNITY

## 2022-07-26 RX ORDER — SODIUM CHLORIDE 0.9 % (FLUSH) 0.9 %
5 SYRINGE (ML) INJECTION
Status: DISCONTINUED | OUTPATIENT
Start: 2022-07-26 | End: 2022-07-27 | Stop reason: HOSPADM

## 2022-07-26 RX ORDER — AMOXICILLIN 250 MG
1 CAPSULE ORAL 2 TIMES DAILY
Status: DISCONTINUED | OUTPATIENT
Start: 2022-07-26 | End: 2022-07-27 | Stop reason: HOSPADM

## 2022-07-26 RX ORDER — DILTIAZEM HYDROCHLORIDE 30 MG/1
120 TABLET, FILM COATED ORAL EVERY 6 HOURS
Status: DISCONTINUED | OUTPATIENT
Start: 2022-07-26 | End: 2022-07-27

## 2022-07-26 RX ORDER — IBUPROFEN 200 MG
200 TABLET ORAL EVERY 6 HOURS PRN
COMMUNITY

## 2022-07-26 RX ORDER — AMOXICILLIN 250 MG
1 CAPSULE ORAL 2 TIMES DAILY PRN
Status: DISCONTINUED | OUTPATIENT
Start: 2022-07-26 | End: 2022-07-26

## 2022-07-26 RX ORDER — NALOXONE HCL 0.4 MG/ML
0.02 VIAL (ML) INJECTION
Status: DISCONTINUED | OUTPATIENT
Start: 2022-07-26 | End: 2022-07-27 | Stop reason: HOSPADM

## 2022-07-26 RX ORDER — ATORVASTATIN CALCIUM 40 MG/1
40 TABLET, FILM COATED ORAL NIGHTLY
Status: DISCONTINUED | OUTPATIENT
Start: 2022-07-26 | End: 2022-07-27 | Stop reason: HOSPADM

## 2022-07-26 RX ORDER — ASPIRIN 81 MG/1
81 TABLET ORAL DAILY
COMMUNITY
End: 2022-07-27

## 2022-07-26 RX ORDER — ROSUVASTATIN CALCIUM 5 MG/1
5 TABLET, COATED ORAL DAILY
COMMUNITY

## 2022-07-26 RX ORDER — FUROSEMIDE 20 MG/1
TABLET ORAL
COMMUNITY
End: 2022-07-26

## 2022-07-26 RX ORDER — METOPROLOL SUCCINATE 25 MG/1
25 TABLET, EXTENDED RELEASE ORAL DAILY
Status: DISCONTINUED | OUTPATIENT
Start: 2022-07-27 | End: 2022-07-27 | Stop reason: HOSPADM

## 2022-07-26 RX ORDER — TALC
9 POWDER (GRAM) TOPICAL NIGHTLY PRN
Status: DISCONTINUED | OUTPATIENT
Start: 2022-07-26 | End: 2022-07-27 | Stop reason: HOSPADM

## 2022-07-26 RX ORDER — FOLIC ACID/MULTIVIT,IRON,MINER 0.4MG-18MG
1 TABLET ORAL DAILY
COMMUNITY

## 2022-07-26 RX ORDER — GLUCAGON 1 MG
1 KIT INJECTION
Status: DISCONTINUED | OUTPATIENT
Start: 2022-07-26 | End: 2022-07-27 | Stop reason: HOSPADM

## 2022-07-26 RX ORDER — PANTOPRAZOLE SODIUM 40 MG/1
40 TABLET, DELAYED RELEASE ORAL DAILY
COMMUNITY
Start: 2022-05-18

## 2022-07-26 RX ORDER — CITALOPRAM 20 MG/1
40 TABLET, FILM COATED ORAL DAILY
Status: DISCONTINUED | OUTPATIENT
Start: 2022-07-27 | End: 2022-07-27 | Stop reason: HOSPADM

## 2022-07-26 RX ORDER — ENOXAPARIN SODIUM 100 MG/ML
40 INJECTION SUBCUTANEOUS EVERY 24 HOURS
Status: DISCONTINUED | OUTPATIENT
Start: 2022-07-26 | End: 2022-07-27 | Stop reason: HOSPADM

## 2022-07-26 RX ORDER — IBUPROFEN 200 MG
24 TABLET ORAL
Status: DISCONTINUED | OUTPATIENT
Start: 2022-07-26 | End: 2022-07-27 | Stop reason: HOSPADM

## 2022-07-26 RX ORDER — PANTOPRAZOLE SODIUM 40 MG/1
40 TABLET, DELAYED RELEASE ORAL DAILY
Status: DISCONTINUED | OUTPATIENT
Start: 2022-07-27 | End: 2022-07-27 | Stop reason: HOSPADM

## 2022-07-26 RX ORDER — CARVEDILOL 12.5 MG/1
TABLET ORAL
COMMUNITY
Start: 2022-03-09 | End: 2022-07-26

## 2022-07-26 RX ORDER — INSULIN GLARGINE 100 [IU]/ML
20 INJECTION, SOLUTION SUBCUTANEOUS NIGHTLY
COMMUNITY
Start: 2022-06-10

## 2022-07-26 RX ORDER — SPIRONOLACTONE 50 MG/1
50 TABLET, FILM COATED ORAL DAILY
COMMUNITY
Start: 2022-07-23

## 2022-07-26 RX ORDER — ACETAMINOPHEN 325 MG/1
650 TABLET ORAL EVERY 8 HOURS PRN
Status: DISCONTINUED | OUTPATIENT
Start: 2022-07-26 | End: 2022-07-26

## 2022-07-26 RX ORDER — DILTIAZEM HYDROCHLORIDE 120 MG/1
120 CAPSULE, COATED, EXTENDED RELEASE ORAL DAILY
COMMUNITY
Start: 2022-06-10

## 2022-07-26 RX ORDER — IBUPROFEN 400 MG/1
400 TABLET ORAL EVERY 6 HOURS PRN
Status: DISCONTINUED | OUTPATIENT
Start: 2022-07-26 | End: 2022-07-27 | Stop reason: HOSPADM

## 2022-07-26 RX ORDER — OMEGA-3 FATTY ACIDS 1000 MG
1 CAPSULE ORAL DAILY
COMMUNITY

## 2022-07-26 RX ORDER — IBUPROFEN 200 MG
16 TABLET ORAL
Status: DISCONTINUED | OUTPATIENT
Start: 2022-07-26 | End: 2022-07-27 | Stop reason: HOSPADM

## 2022-07-26 RX ORDER — PIOGLITAZONEHYDROCHLORIDE 30 MG/1
TABLET ORAL
COMMUNITY
End: 2022-07-26

## 2022-07-26 RX ADMIN — KETOROLAC TROMETHAMINE 10 MG: 30 INJECTION, SOLUTION INTRAMUSCULAR; INTRAVENOUS at 12:07

## 2022-07-26 RX ADMIN — ATORVASTATIN CALCIUM 40 MG: 40 TABLET, FILM COATED ORAL at 08:07

## 2022-07-26 RX ADMIN — DOCUSATE SODIUM - SENNOSIDES 1 TABLET: 50; 8.6 TABLET, FILM COATED ORAL at 08:07

## 2022-07-26 RX ADMIN — GADOBUTROL 10 ML: 604.72 INJECTION INTRAVENOUS at 07:07

## 2022-07-26 RX ADMIN — INSULIN DETEMIR 10 UNITS: 100 INJECTION, SOLUTION SUBCUTANEOUS at 08:07

## 2022-07-26 RX ADMIN — IBUPROFEN 400 MG: 400 TABLET, FILM COATED ORAL at 05:07

## 2022-07-26 RX ADMIN — ENOXAPARIN SODIUM 40 MG: 100 INJECTION SUBCUTANEOUS at 05:07

## 2022-07-26 RX ADMIN — IBUPROFEN 400 MG: 400 TABLET, FILM COATED ORAL at 11:07

## 2022-07-26 RX ADMIN — DILTIAZEM HYDROCHLORIDE 120 MG: 30 TABLET, FILM COATED ORAL at 05:07

## 2022-07-26 RX ADMIN — DILTIAZEM HYDROCHLORIDE 120 MG: 30 TABLET, FILM COATED ORAL at 11:07

## 2022-07-26 NOTE — ASSESSMENT & PLAN NOTE
BUN/Crt on admit 29/1.4 w/ BL 27/1 though still CKD3    Plan:  Cnt to monitor renal fxn  Avoid Nephrotoxins and renally dose meds

## 2022-07-26 NOTE — Clinical Note
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

## 2022-07-26 NOTE — ASSESSMENT & PLAN NOTE
Hx of Hep C from blood transfusion in '70  Cirrhosis and varices subsequently  Treated w/ epclusa >10yrs ago  LFTs on admit wnl  T bili slightly elevated to 1.3   Albumin decreased to 3.2    Plan:  Cnt to monitor liver fxn and variceal bleeding while on anticoag

## 2022-07-26 NOTE — SUBJECTIVE & OBJECTIVE
Review of Systems   Constitutional:  Negative for appetite change, chills and fever.   HENT:  Negative for rhinorrhea, sore throat and trouble swallowing.    Eyes:         Eye inflammation since shingles 1 yr ago (uses fluorometholone drops QD)   Respiratory:  Negative for cough and shortness of breath.         + snoring   Cardiovascular:  Positive for palpitations. Negative for chest pain and leg swelling.        No palpitations since Friday 7/22   Gastrointestinal:  Positive for abdominal pain. Negative for blood in stool, nausea and vomiting.        Sharp intermittent LUQ pain   Genitourinary:  Negative for difficulty urinating and dysuria.        Dark urine for past 2 weeks   Musculoskeletal:  Negative for myalgias.   Skin: Negative.    Allergic/Immunologic: Negative.    Neurological:  Negative for dizziness, weakness and headaches.   Hematological: Negative.    Psychiatric/Behavioral:  Positive for sleep disturbance.    Objective:     Vital Signs (Most Recent):  Temp: 98.2 °F (36.8 °C) (07/26/22 1450)  Pulse: 99 (07/26/22 1450)  Resp: 19 (07/26/22 1450)  BP: (!) 155/70 (07/26/22 1450)  SpO2: 99 % (07/26/22 1450)   Vital Signs (24h Range):  Temp:  [98.2 °F (36.8 °C)-98.9 °F (37.2 °C)] 98.2 °F (36.8 °C)  Pulse:  [68-99] 99  Resp:  [17-20] 19  SpO2:  [98 %-99 %] 99 %  BP: (126-155)/(70-78) 155/70     Weight: 95.3 kg (210 lb)  Body mass index is 36.05 kg/m².  No intake or output data in the 24 hours ending 07/26/22 1526   Physical Exam  Constitutional:       General: She is not in acute distress.     Appearance: She is obese. She is not ill-appearing.   HENT:      Head: Normocephalic and atraumatic.      Right Ear: External ear normal.      Left Ear: External ear normal.      Nose: Nose normal.      Mouth/Throat:      Comments: Dry lips  Eyes:      Extraocular Movements: Extraocular movements intact.      Pupils: Pupils are equal, round, and reactive to light.      Comments: Mild eye redness bilaterally    Cardiovascular:      Rate and Rhythm: Regular rhythm. Tachycardia present.      Pulses: Normal pulses.      Heart sounds: Normal heart sounds.   Pulmonary:      Effort: Pulmonary effort is normal.      Breath sounds: Normal breath sounds.   Abdominal:      General: Bowel sounds are normal.      Palpations: Abdomen is soft.      Tenderness: There is abdominal tenderness.   Musculoskeletal:         General: Normal range of motion.      Cervical back: Normal range of motion.   Skin:     General: Skin is warm and dry.   Neurological:      General: No focal deficit present.      Mental Status: She is alert and oriented to person, place, and time. Mental status is at baseline.   Psychiatric:         Mood and Affect: Mood normal.         Behavior: Behavior normal.         Thought Content: Thought content normal.         Judgment: Judgment normal.       Recent Labs   Lab 07/25/22  1054 07/25/22  1305 07/26/22  1253   WBC 5.73  --  4.64   HGB 12.5  --  11.6*   HCT 37.5 36 34.2*   MCV 95  --  93   RBC 3.96*  --  3.68*   MCH 31.6*  --  31.5*   MCHC 33.3  --  33.9   RDW 12.5  --  12.6   PLT 86*  --  92*   MPV 10.4  --  10.2   GRAN 74.9*  4.3  --  76.4*  3.5   LYMPH 14.0*  0.8*  --  12.9*  0.6*   MONO 9.6  0.6  --  8.8  0.4   EOSINOPHIL 1.0  --  1.5   BASOPHIL 0.2  --  0.2     Recent Labs   Lab 07/25/22  1054 07/26/22  1253   * 137   K 5.4* 4.4    106   CO2 23 19*   ANIONGAP 7* 12   BUN 28* 29*   CREATININE 1.23 1.4   * 306*   CALCIUM 9.0 9.3   PROT 7.2 7.0   ALBUMIN 4.1 3.2*   ALKPHOS 87 103   BILITOT 2.1* 1.3*   ALT 19 15   AST 29 20   ESTGFRAFRICA 51.0* 44*   EGFRNONAA 44.2* 38*     Recent Labs   Lab 07/25/22  1024   COLORU [place]*   APPEARANCEUA Hazy*   PHUR 6.0   SPECGRAV 1.020   PROTEINUA Trace*   GLUCUA Negative   KETONESU Trace*   BILIRUBINUA 1+*   OCCULTUA Trace*   UROBILINOGEN >=8.0*   NITRITE Negative   LEUKOCYTESUR Negative     No results for input(s): TROPONINI, CPK, CPKMB in the last 168  hours.  No results for input(s): PT, INR, APTT in the last 168 hours.  Recent Labs   Lab 07/26/22  1250   TSH 2.563     MRI Abdomen W WO Contrast    Result Date: 7/26/2022  EXAMINATION: MRI ABDOMEN W WO CONTRAST CLINICAL HISTORY: Splenomegaly, not elsewhere classified.  Left lower quadrant pain.  Abnormal abdominal CT, splenic lesion. TECHNIQUE: MR abdomen with and without contrast performed with multiplanar T1, in and out of phase, and T2 weighted sequences including dynamic post-contrast imaging.  10 cc Gadavist. COMPARISON: Abdominal CT with contrast 07/25/2022.  Abdominal ultrasound 03/21/2022. FINDINGS: As seen on the  CT scan yesterday, there is a nonenhancing geographic area of signal alteration involving the lower aspect of the spleen, 6 6.9 x 4.2 x 6.3 cm, with isointense T1 and predominantly homogeneous hyperintense T2 signal with peripheral intermediate signal.  Again, there is a vessel seen coursing through the inferior medial margin of the signal alteration.  Again, there is trace aparna-splenic fluid.  Again, there is mild splenomegaly , 12.7 x 6 x 13.1 cm.  Looking back at the prior abdominal ultrasound, March 2022, the spleen demonstrated normal homogeneous echogenicity without mass.  These imaging features represent splenic infarction sequela.  The morphology of the signal alteration, lack of peripheral rim enhancement, and the vessel coursing through the signal alteration would make a large splenic abscess very unlikely. Additionally, there is concomitant deep venous thrombosis with partially occlusive filling defect involving the distal splenic vein and main portal vein, same pattern as on yesterday's CT exam.  The SMV is patent. Again, nodular liver surface contour characteristic of cirrhosis.  The liver demonstrates homogeneous signal intensity and enhancement without mass. Again, the gallbladder is distended with a large gallstone, 2.8 cm, and a second 0.8 cm stone.  No evidence of  gallbladder inflammation.  The common hepatic duct/common bile duct is dilated, up to 10 mm, with normal tapering of the distal common bile duct.  There is no intrahepatic ductal dilatation.  No evidence of a intraductal filling defect, dedicated MRCP imaging not performed.  No pancreatic duct dilatation. Pancreas, adrenal glands, and kidneys are normal. Normal caliber aorta.  There is no lymphadenopathy.     Mild splenomegaly with superimposed splenic infarction with nonenhancing mildly complex fluid collection anterior inferior aspect of the spleen, 7 x 4 x 6 cm, with minimal aparna-splenic fluid in the setting of deep venous thrombosis with partially occlusive clot distal splenic vein/main portal vein. Cirrhosis.  Negative for liver mass. Cholelithiasis. COMMUNICATION This critical result was discovered/received at 08:45 hours.  The critical information above was relayed directly by me by telephone to emergency department physician, Dr. Fuller, on 07/26/2022 at 08:50 hours. Electronically signed by: Cesar Giels MD Date:    07/26/2022 Time:    09:02    CT Abdomen Pelvis With Contrast    Result Date: 7/25/2022  EXAMINATION: CT ABDOMEN PELVIS WITH CONTRAST CLINICAL HISTORY: LLQ abdominal pain; TECHNIQUE: Standard abdomen and pelvis CT protocol with IV contrast was performed.  100 mL of Omnipaque 350 contrast material was used for this examination.  There was no oral contrast administered. COMPARISON: An ultrasound examination of the abdomen performed on 03/21/2022. FINDINGS: Finding: The size of the heart is within normal limits.  There is an 8 mm noncalcified pulmonary nodule in the left lower lobe.  There are mild dependent atelectatic changes in both lungs.  There is no pneumothorax or pleural effusion. The liver has a nodular surface.  There is a 29 mm stone in the dependent portion of the gallbladder.  There is moderate generalized atrophy of the pancreas.  There is a 71 mm area of hypodensity in the anterior  inferior aspect of the spleen.  There are mild inflammatory changes adjacent to the inferior aspect of the spleen.  The adrenals and kidneys are normal in appearance. The ureters and the urinary bladder are normal in appearance.  The uterus is absent.  The appendix is normal in appearance.  There is a marked amount of diverticulosis throughout the majority of the colon.  There are findings characteristic of a moderate size diverticulum off of the left side of the 2nd portion of the duodenum.  There are findings characteristic of a small diverticulum off of the superior aspect of the 3rd portion of the duodenum.  There is a tiny amount of free fluid within the pelvis.  There is no pneumoperitoneum.  There is a moderate amount of atherosclerosis.     1. There is a 71 mm area of hypodensity in the anterior inferior aspect of the spleen.  There are mild inflammatory changes adjacent to the inferior aspect of the spleen.  Hence common infectious process cannot be excluded.  If additional imaging evaluation is clinically indicated, I recommend consideration of an MRI examination of the abdomen without and with IV contrast. 2. There is a 29 mm stone in the dependent portion of the gallbladder. 3. The liver has a nodular surface.  This is characteristic of hepatic cirrhosis. 4. There is an 8 mm noncalcified pulmonary nodule in the left lower lobe.  I recommend consideration of a follow-up CT examination of the thorax without IV contrast in 4 months. 5. There is a marked amount of diverticulosis throughout the majority of the colon. There are findings characteristic of a moderate size diverticulum off of the left side of the 2nd portion of the duodenum.  There are findings characteristic of a small diverticulum off of the superior aspect of the 3rd portion of the duodenum. All CT scans at this facility use dose modulation, iterative reconstruction, and/or weight base dosing when appropriate to reduce radiation dose when  appropriate to reduce radiation dose to as low as reasonably achievable. Electronically signed by: Jered Vicente MD Date:    07/25/2022 Time:    12:16    Microbiology Results (last 7 days)       ** No results found for the last 168 hours. **

## 2022-07-26 NOTE — H&P
[facility] Medicine  History & Physical    Patient Name: Anushka Carl  MRN: [id number]  Patient Class: OP- Observation  Admission Date: 7/26/2022  Attending Physician: Axel Jaimes MD   Primary Care Provider: Nahum Hull MD         Patient information was obtained from patient, spouse/SO and ER records.     Subjective:     Principal Problem:Lesion of spleen    Chief Complaint:   Chief Complaint   Patient presents with    Abdominal Pain     Left lateral side pain above waist. Mri showed this am blood clot in her spleen and was directed to come to ED        HPI: Ms. Carl is a 72 y.o. 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 On 7/24, she noticed a sharp LUQ pain that has been intermittent since. She denies any associated events or trauma. She presented to an outside ED on 7/25 with this pain, and a subsequent outpatient MRI showed a DVT with a partially occlusive clot of the distal splenic vein/main portal vein and a splenic infarct. Upon read of MRI on 7/26, she was instructed to go to the ED for evaluation and was transferred to [facility] from [facility] ED. On arrival her LUQ pain is mild and intermittent. She denies any fever/chills or current nausea/vomiting. She had some mild nausea on Sunday but no vomiting. Her bowel movements have also been normal and not black or bloody. She had some palpitations a few times last week but none since this LUQ pain has started. She describes her lifestyle as sedentary since her Afib diagnosis 6 weeks ago but has no history of clots. She is not a candidate for anticoagulation due to a history of esophageal varices (monitored by EGD every 6 months). She stopped taking her aspirin and vitamins/supplements 1 wk ago due to a scheduled heart surgery (clip) next week to address her Afib. She only takes metoprolol when her  heart rate is above 80-90 bpm.    In the ED, she was afebrile with a HR of 99, RR of 19, O2 sat of 99%, and BP of 155/70. EKG from 7/25 showed atrial fibrillation with rapid ventricular response and low voltage QRS. Labs from 7/26 are WBC 5.73, Hgb 11.6, Hct 34.2, HgbA1c 8.2, BUN 29 (baseline 27), Cr 1.4 (baseline 1.0), Glc 306. She was given ketorolac in the ED for pain. She is being admitted to the [facility] FM service with general surgery consult for splenic vein thrombosis and infarct.          Review of Systems   Constitutional:  Negative for appetite change, chills and fever.   HENT:  Negative for rhinorrhea, sore throat and trouble swallowing.    Respiratory:  Negative for cough and shortness of breath.    Cardiovascular:  Positive for palpitations. Negative for chest pain and leg swelling.   Gastrointestinal:  Positive for abdominal pain. Negative for blood in stool, nausea and vomiting.        Sharp intermittent LUQ pain   Genitourinary:  Negative for difficulty urinating and dysuria.   Musculoskeletal:  Negative for myalgias.   Skin: Negative.    Allergic/Immunologic: Negative.    Neurological:  Negative for dizziness, weakness and headaches.   Hematological: Negative.    Psychiatric/Behavioral:  Positive for sleep disturbance.    Objective:     Vital Signs (Most Recent):  Temp: 98.2 °F (36.8 °C) (07/26/22 1450)  Pulse: 99 (07/26/22 1450)  Resp: 19 (07/26/22 1450)  BP: (!) 155/70 (07/26/22 1450)  SpO2: 99 % (07/26/22 1450)   Vital Signs (24h Range):  Temp:  [98.2 °F (36.8 °C)-98.9 °F (37.2 °C)] 98.2 °F (36.8 °C)  Pulse:  [68-99] 99  Resp:  [17-20] 19  SpO2:  [98 %-99 %] 99 %  BP: (126-155)/(70-78) 155/70     Weight: 95.3 kg (210 lb)  Body mass index is 36.05 kg/m².  No intake or output data in the 24 hours ending 07/26/22 1526   Physical Exam  Constitutional:       General: She is not in acute distress.     Appearance: She is obese. She is not ill-appearing.   HENT:      Head: Normocephalic and atraumatic.       Right Ear: External ear normal.      Left Ear: External ear normal.      Nose: Nose normal.      Mouth/Throat:      Comments: Dry lips  Eyes:      Extraocular Movements: Extraocular movements intact.      Pupils: Pupils are equal, round, and reactive to light.      Comments: Mild eye redness bilaterally   Cardiovascular:      Rate and Rhythm: Regular rhythm. Tachycardia present.      Pulses: Normal pulses.      Heart sounds: Normal heart sounds.   Pulmonary:      Effort: Pulmonary effort is normal.      Breath sounds: Normal breath sounds.   Abdominal:      General: Bowel sounds are normal.      Palpations: Abdomen is soft.      Tenderness: There is abdominal tenderness to LUQ.   Musculoskeletal:         General: Normal range of motion.      Cervical back: Normal range of motion.   Skin:     General: Skin is warm and dry.   Neurological:      General: No focal deficit present.      Mental Status: She is alert and oriented to person, place, and time. Mental status is at baseline.   Psychiatric:         Mood and Affect: Mood normal.         Behavior: Behavior normal.         Thought Content: Thought content normal.         Judgment: Judgment normal.       Recent Labs   Lab 07/25/22  1054 07/25/22  1305 07/26/22  1253   WBC 5.73  --  4.64   HGB 12.5  --  11.6*   HCT 37.5 36 34.2*   MCV 95  --  93   RBC 3.96*  --  3.68*   MCH 31.6*  --  31.5*   MCHC 33.3  --  33.9   RDW 12.5  --  12.6   PLT 86*  --  92*   MPV 10.4  --  10.2   GRAN 74.9*  4.3  --  76.4*  3.5   LYMPH 14.0*  0.8*  --  12.9*  0.6*   MONO 9.6  0.6  --  8.8  0.4   EOSINOPHIL 1.0  --  1.5   BASOPHIL 0.2  --  0.2     Recent Labs   Lab 07/25/22  1054 07/26/22  1253   * 137   K 5.4* 4.4    106   CO2 23 19*   ANIONGAP 7* 12   BUN 28* 29*   CREATININE 1.23 1.4   * 306*   CALCIUM 9.0 9.3   PROT 7.2 7.0   ALBUMIN 4.1 3.2*   ALKPHOS 87 103   BILITOT 2.1* 1.3*   ALT 19 15   AST 29 20   ESTGFRAFRICA 51.0* 44*   EGFRNONAA 44.2* 38*     Recent  Labs   Lab 07/25/22  1024   COLORU Minto*   APPEARANCEUA Hazy*   PHUR 6.0   SPECGRAV 1.020   PROTEINUA Trace*   GLUCUA Negative   KETONESU Trace*   BILIRUBINUA 1+*   OCCULTUA Trace*   UROBILINOGEN >=8.0*   NITRITE Negative   LEUKOCYTESUR Negative     No results for input(s): TROPONINI, CPK, CPKMB in the last 168 hours.  No results for input(s): PT, INR, APTT in the last 168 hours.  Recent Labs   Lab 07/26/22  1250   TSH 2.563     MRI Abdomen W WO Contrast    Result Date: 7/26/2022  EXAMINATION: MRI ABDOMEN W WO CONTRAST CLINICAL HISTORY: Splenomegaly, not elsewhere classified.  Left lower quadrant pain.  Abnormal abdominal CT, splenic lesion. TECHNIQUE: MR abdomen with and without contrast performed with multiplanar T1, in and out of phase, and T2 weighted sequences including dynamic post-contrast imaging.  10 cc Gadavist. COMPARISON: Abdominal CT with contrast 07/25/2022.  Abdominal ultrasound 03/21/2022. FINDINGS: As seen on the  CT scan yesterday, there is a nonenhancing geographic area of signal alteration involving the lower aspect of the spleen, 6 6.9 x 4.2 x 6.3 cm, with isointense T1 and predominantly homogeneous hyperintense T2 signal with peripheral intermediate signal.  Again, there is a vessel seen coursing through the inferior medial margin of the signal alteration.  Again, there is trace aparna-splenic fluid.  Again, there is mild splenomegaly , 12.7 x 6 x 13.1 cm.  Looking back at the prior abdominal ultrasound, March 2022, the spleen demonstrated normal homogeneous echogenicity without mass.  These imaging features represent splenic infarction sequela.  The morphology of the signal alteration, lack of peripheral rim enhancement, and the vessel coursing through the signal alteration would make a large splenic abscess very unlikely. Additionally, there is concomitant deep venous thrombosis with partially occlusive filling defect involving the distal splenic vein and main portal vein, same  pattern as on yesterday's CT exam.  The SMV is patent. Again, nodular liver surface contour characteristic of cirrhosis.  The liver demonstrates homogeneous signal intensity and enhancement without mass. Again, the gallbladder is distended with a large gallstone, 2.8 cm, and a second 0.8 cm stone.  No evidence of gallbladder inflammation.  The common hepatic duct/common bile duct is dilated, up to 10 mm, with normal tapering of the distal common bile duct.  There is no intrahepatic ductal dilatation.  No evidence of a intraductal filling defect, dedicated MRCP imaging not performed.  No pancreatic duct dilatation. Pancreas, adrenal glands, and kidneys are normal. Normal caliber aorta.  There is no lymphadenopathy.     Mild splenomegaly with superimposed splenic infarction with nonenhancing mildly complex fluid collection anterior inferior aspect of the spleen, 7 x 4 x 6 cm, with minimal aparna-splenic fluid in the setting of deep venous thrombosis with partially occlusive clot distal splenic vein/main portal vein. Cirrhosis.  Negative for liver mass. Cholelithiasis. COMMUNICATION This critical result was discovered/received at 08:45 hours.  The critical information above was relayed directly by me by telephone to emergency department physician, Dr. Fuller, on 07/26/2022 at 08:50 hours. Electronically signed by: Cesar Giles MD Date:    07/26/2022 Time:    09:02    CT Abdomen Pelvis With Contrast    Result Date: 7/25/2022  EXAMINATION: CT ABDOMEN PELVIS WITH CONTRAST CLINICAL HISTORY: LLQ abdominal pain; TECHNIQUE: Standard abdomen and pelvis CT protocol with IV contrast was performed.  100 mL of Omnipaque 350 contrast material was used for this examination.  There was no oral contrast administered. COMPARISON: An ultrasound examination of the abdomen performed on 03/21/2022. FINDINGS: Finding: The size of the heart is within normal limits.  There is an 8 mm noncalcified pulmonary nodule in the left lower lobe.  There  are mild dependent atelectatic changes in both lungs.  There is no pneumothorax or pleural effusion. The liver has a nodular surface.  There is a 29 mm stone in the dependent portion of the gallbladder.  There is moderate generalized atrophy of the pancreas.  There is a 71 mm area of hypodensity in the anterior inferior aspect of the spleen.  There are mild inflammatory changes adjacent to the inferior aspect of the spleen.  The adrenals and kidneys are normal in appearance. The ureters and the urinary bladder are normal in appearance.  The uterus is absent.  The appendix is normal in appearance.  There is a marked amount of diverticulosis throughout the majority of the colon.  There are findings characteristic of a moderate size diverticulum off of the left side of the 2nd portion of the duodenum.  There are findings characteristic of a small diverticulum off of the superior aspect of the 3rd portion of the duodenum.  There is a tiny amount of free fluid within the pelvis.  There is no pneumoperitoneum.  There is a moderate amount of atherosclerosis.     1. There is a 71 mm area of hypodensity in the anterior inferior aspect of the spleen.  There are mild inflammatory changes adjacent to the inferior aspect of the spleen.  Hence common infectious process cannot be excluded.  If additional imaging evaluation is clinically indicated, I recommend consideration of an MRI examination of the abdomen without and with IV contrast. 2. There is a 29 mm stone in the dependent portion of the gallbladder. 3. The liver has a nodular surface.  This is characteristic of hepatic cirrhosis. 4. There is an 8 mm noncalcified pulmonary nodule in the left lower lobe.  I recommend consideration of a follow-up CT examination of the thorax without IV contrast in 4 months. 5. There is a marked amount of diverticulosis throughout the majority of the colon. There are findings characteristic of a moderate size diverticulum off of the left side  of the 2nd portion of the duodenum.  There are findings characteristic of a small diverticulum off of the superior aspect of the 3rd portion of the duodenum. All CT scans at this facility use dose modulation, iterative reconstruction, and/or weight base dosing when appropriate to reduce radiation dose when appropriate to reduce radiation dose to as low as reasonably achievable. Electronically signed by: Jered Vicente MD Date:    07/25/2022 Time:    12:16    Microbiology Results (last 7 days)       ** No results found for the last 168 hours. **              Assessment/Plan:     * Lesion of spleen  Pt w/ sharp intermittent LUQ pain since 7/24   Afib dx 1 mo prior and not on anti-coag 2/2 cirrhosis and varices from Hep C   Presented to ED 7/25 for this issue and CT Abd/ Pelvis at that time which showed 71 mm hypodensity in the anterior inferior aspect of spleen  MRI performed outpt 7/26 w/ splenic infarct and partially occlusive DVT in distal splenic vein/main portal vein  Gen Surg consulted in ED for possible splenectomy  Anticoag started w/ lovenox after risk/benefits discussion     Plan  F/u Gen Surg recs  Cnt anticoag and monitor for signs of bleeding  ECHO bubble study pending  DVT US pending      Hepatitis C  Hx of Hep C from blood transfusion in '70  Cirrhosis and varices subsequently  Treated w/ epclusa >10yrs ago  LFTs on admit wnl  T bili slightly elevated to 1.3   Albumin decreased to 3.2    Plan:  Cnt to monitor liver fxn and variceal bleeding while on anticoag      Anxiety  Cnt home citalopram 40mg qd       Type 2 diabetes mellitus  A1C 8.2   Home meds include lantus 20u qhs and glipizde    Plan:  Monitor BG w/ goal 140-180  Hold oral medications  Insulin detemir 10u qhs w/ MDSS  Will adjust as needed      Stage 3 chronic kidney disease  BUN/Crt on admit 29/1.4 w/ BL 27/1 though still CKD3    Plan:  Cnt to monitor renal fxn  Avoid Nephrotoxins and renally dose meds      Hyperlipidemia  Home meds include  "Crestor 5 mg    Plan:  Switch to atorvastatin on formulary while inpt      Essential hypertension  Normotensive on admit 135/78  Home meds Diltiazem, Spironolactone 50mg qd, and metoprolol      Plan:  Cnt home meds  Cnt to monitor for HTN  Hydralazine PRN for SPB >180    Atrial fibrillation  Afib dx 6wks prior   Pt on Metoprolol 25mg qd (taken prn "for HR >80") as well as diltiazem 120mg ER qd  EKG from ED 7/25 w/ Afib RVR   EKG from admit pending    Plan:  Cnt home metoprolol and diltiazem   ECHO bubble study pending  EKG pending         VTE Risk Mitigation (From admission, onward)         Ordered     enoxaparin injection 40 mg  Daily         07/26/22 1304     IP VTE HIGH RISK PATIENT  Once         07/26/22 1304     Place sequential compression device  Until discontinued         07/26/22 1304                   Lucian Reyes MD  Department of Hospital Medicine   [facility]  "

## 2022-07-26 NOTE — ED NOTES
APPEARANCE: Awake, alert, & oriented. No acute distress.  CARDIAC: Normal rate and rhythm. Denies chest pain.     RESPIRATORY: Respirations are even and unlabored no obvious signs of distress. No accessory muscle use. Breath sounds clear bilaterally throughout chest.  PERIPHERAL VASCULAR: peripheral pulses present. Normal cap refill. No edema.   GASTRO: soft, no tenderness except left lower quadrant pain that is intermittent, no abdominal distention.  MUSC: Full ROM. No bony tenderness or soft tissue tenderness. No obvious deformity.  SKIN: Skin is warm, dry, and intact. Normal skin turgor and color.  NEURO: Equal strength bilaterally. Epsom coma scale: Eye Response-4, Motor Response-6, Verbal Response-5. Total=15. Clear speech. No neurological abnormalities.   EENT: No c/o vision or hearing difficulties. Oropharynx clear.

## 2022-07-26 NOTE — ED PROVIDER NOTES
Encounter Date: 7/25/2022       History     Chief Complaint   Patient presents with    Abdominal Pain     Pt reports pain to the lower left abdominal area. Pt denies nvd, pt denies pain or burning on urination. Pt states she has dark colored urine and a sweet scent to the urine. Pt states she is diabetic and her CBG was 120 this morning.      HPI  Review of patient's allergies indicates:   Allergen Reactions    Codeine Swelling    Penicillins Rash     History reviewed. No pertinent past medical history.  Past Surgical History:   Procedure Laterality Date    HYSTERECTOMY      OOPHORECTOMY       Family History   Problem Relation Age of Onset    Breast cancer Sister         Review of Systems    Physical Exam     Initial Vitals [07/25/22 1011]   BP Pulse Resp Temp SpO2   (!) 137/95 (!) 115 18 99.2 °F (37.3 °C) 98 %      MAP       --         Physical Exam    ED Course   Procedures  Labs Reviewed   CBC W/ AUTO DIFFERENTIAL - Abnormal; Notable for the following components:       Result Value    RBC 3.96 (*)     MCH 31.6 (*)     Platelets 86 (*)     Lymph # 0.8 (*)     Gran % 74.9 (*)     Lymph % 14.0 (*)     All other components within normal limits   COMPREHENSIVE METABOLIC PANEL - Abnormal; Notable for the following components:    Sodium 135 (*)     Potassium 5.4 (*)     Glucose 169 (*)     BUN 28 (*)     Total Bilirubin 2.1 (*)     Anion Gap 7 (*)     eGFR if  51.0 (*)     eGFR if non  44.2 (*)     All other components within normal limits   URINALYSIS, REFLEX TO URINE CULTURE - Abnormal; Notable for the following components:    Color, UA Orange (*)     Appearance, UA Hazy (*)     Protein, UA Trace (*)     Ketones, UA Trace (*)     Bilirubin (UA) 1+ (*)     Occult Blood UA Trace (*)     Urobilinogen, UA >=8.0 (*)     All other components within normal limits    Narrative:     Preferred Collection Type->Urine, Clean Catch  Specimen Source->Urine  Collection Type->Urine, Clean Catch    BETA - HYDROXYBUTYRATE, SERUM - Abnormal; Notable for the following components:    Beta-Hydroxybutyrate 0.6 (*)     All other components within normal limits   POCT GLUCOSE - Abnormal; Notable for the following components:    POCT Glucose 163 (*)     All other components within normal limits   ISTAT PROCEDURE - Abnormal; Notable for the following components:    POC PH 7.346 (*)     POC PCO2 46.0 (*)     POC PO2 19 (*)     POC SATURATED O2 27 (*)     All other components within normal limits   LIPASE        ECG Results          EKG 12-lead (Final result)  Result time 07/25/22 22:10:37    Final result by Interface, Lab In [facility] (07/25/22 22:10:37)                 Narrative:    Test Reason : R00.0,    Vent. Rate : 112 BPM     Atrial Rate : 144 BPM     P-R Int : 000 ms          QRS Dur : 094 ms      QT Int : 348 ms       P-R-T Axes : 000 014 056 degrees     QTc Int : 475 ms    Atrial fibrillation with rapid ventricular response  Low voltage QRS  Abnormal ECG  No previous ECGs available  Confirmed by Lenny Zhao MD ([id number]) on 7/25/2022 10:10:25 PM    Referred By: [email or web address]   SELF           Confirmed By:Lenny Zhao MD                            Imaging Results          CT Abdomen Pelvis With Contrast (Final result)  Result time 07/25/22 12:16:49    Final result by NADEEM Vicente Sr., MD (07/25/22 12:16:49)                 Impression:      1. There is a 71 mm area of hypodensity in the anterior inferior aspect of the spleen.  There are mild inflammatory changes adjacent to the inferior aspect of the spleen.  Hence common infectious process cannot be excluded.  If additional imaging evaluation is clinically indicated, I recommend consideration of an MRI examination of the abdomen without and with IV contrast.  2. There is a 29 mm stone in the dependent portion of the gallbladder.  3. The liver has a nodular surface.  This is characteristic of hepatic cirrhosis.  4. There is an 8 mm noncalcified pulmonary  nodule in the left lower lobe.  I recommend consideration of a follow-up CT examination of the thorax without IV contrast in 4 months.  5. There is a marked amount of diverticulosis throughout the majority of the colon. There are findings characteristic of a moderate size diverticulum off of the left side of the 2nd portion of the duodenum.  There are findings characteristic of a small diverticulum off of the superior aspect of the 3rd portion of the duodenum.  All CT scans at this facility use dose modulation, iterative reconstruction, and/or weight base dosing when appropriate to reduce radiation dose when appropriate to reduce radiation dose to as low as reasonably achievable.      Electronically signed by: Jered Vicente MD  Date:    07/25/2022  Time:    12:16             Narrative:    EXAMINATION:  CT ABDOMEN PELVIS WITH CONTRAST    CLINICAL HISTORY:  LLQ abdominal pain;    TECHNIQUE:  Standard abdomen and pelvis CT protocol with IV contrast was performed.  100 mL of Omnipaque 350 contrast material was used for this examination.  There was no oral contrast administered.    COMPARISON:  An ultrasound examination of the abdomen performed on 03/21/2022.    FINDINGS:  Finding: The size of the heart is within normal limits.  There is an 8 mm noncalcified pulmonary nodule in the left lower lobe.  There are mild dependent atelectatic changes in both lungs.  There is no pneumothorax or pleural effusion.    The liver has a nodular surface.  There is a 29 mm stone in the dependent portion of the gallbladder.  There is moderate generalized atrophy of the pancreas.  There is a 71 mm area of hypodensity in the anterior inferior aspect of the spleen.  There are mild inflammatory changes adjacent to the inferior aspect of the spleen.  The adrenals and kidneys are normal in appearance. The ureters and the urinary bladder are normal in appearance.  The uterus is absent.  The appendix is normal in appearance.  There is a marked  amount of diverticulosis throughout the majority of the colon.  There are findings characteristic of a moderate size diverticulum off of the left side of the 2nd portion of the duodenum.  There are findings characteristic of a small diverticulum off of the superior aspect of the 3rd portion of the duodenum.  There is a tiny amount of free fluid within the pelvis.  There is no pneumoperitoneum.  There is a moderate amount of atherosclerosis.                                 Medications   sodium chloride 0.9% bolus 1,000 mL (0 mLs Intravenous Stopped 7/25/22 1155)   ketorolac injection 9.999 mg (9.999 mg Intravenous Given 7/25/22 1105)   iohexoL (OMNIPAQUE 350) injection 100 mL (100 mLs Intravenous Given 7/25/22 1150)                Attending Attestation:   Physician Attestation Statement for Resident:  As the supervising MD  -: Reviewed the note from yesterday.  Apparently, patient was discharged per recommendation of Dr. Ham.  An outpatient MRI was ordered of the abdomen and pelvis, by NP.   Received call from Radiologist today.  An outpatient MRI was ordered which shows a splenic infarct.  Patient has a DVT in the splenic vein.  Called patient and told her to go directly to the hospital for admission and evaluation.  She will likely need general surgery consult and be placed on anticoagulation.  Patient states she will go directly to the hospital                ED Course as of 07/26/22 1011   Mon Jul 25, 2022   1019 BP(!): 137/95 [AT]   1019 Temp: 99.2 °F (37.3 °C) [AT]   1019 Temp src: Oral [AT]   1019 Pulse(!): 115 [AT]   1019 SpO2: 98 % [AT]   1019 Resp: 18 [AT]   1146 Beta-Hydroxybutyrate(!): 0.6 [AT]   1146 Sodium(!): 135 [AT]   1146 Glucose(!): 169 [AT]   1146 BUN(!): 28 [AT]   1146 BILIRUBIN TOTAL(!): 2.1 [AT]   1252 Spoke with Dr. Ham and she feels the patient can be followed up as outpatient [AT]   1321 POC Potassium: 4.4 [AT]   1321 POC PH(!): 7.346 [AT]      ED Course User Index  [AT] Mary Melendez,  FNP             Clinical Impression:   Final diagnoses:  [R00.0] Tachycardia  [E86.0] Dehydration (Primary)  [R10.12] LUQ abdominal pain  [D73.89] Lesion of spleen  [R16.1] Splenomegaly, not elsewhere classified          ED Disposition Condition    Discharge Stable        ED Prescriptions     None        Follow-up Information     Follow up With Specialties Details Why Contact Info    Nhaum Hull MD Family Medicine Schedule an appointment as soon as possible for a visit   [address] [facility]  [address]  [facility] [address]  [phone]             Dior Fuller MD  07/26/22 1011

## 2022-07-26 NOTE — ED PROVIDER NOTES
Encounter Date: 7/26/2022       History     Chief Complaint   Patient presents with    Abdominal Pain     Left lateral side pain above waist. Mri showed this am blood clot in her spleen and was directed to come to ED     72 y.o. female with Atrial fibrillation, HCV, cirrhosis presents after being found with a splenic infarct on outside imaging.  Patient reports she has been having abdominal pain for several days on the left side of her abdomen, and was evaluated at outside ED.  Patient was found to have a splenic thrombus and infarct with associated fluid collection.  Patient was instructed to come the ED for further management.  Patient took ibuprofen overnight for pain with some improvement.  Patient denies any fever/chills, nausea/vomiting or patient is not on anticoagulation for atrial fibrillation      The history is provided by the patient and medical records.     Review of patient's allergies indicates:   Allergen Reactions    Codeine Swelling    Penicillins Rash     No past medical history on file.  Past Surgical History:   Procedure Laterality Date    HYSTERECTOMY      OOPHORECTOMY       Family History   Problem Relation Age of Onset    Breast cancer Sister         Review of Systems   Constitutional: Negative for chills and fever.   HENT: Negative for rhinorrhea and sore throat.    Eyes: Negative for photophobia and visual disturbance.   Respiratory: Negative for cough, chest tightness and shortness of breath.    Cardiovascular: Negative for chest pain and palpitations.   Gastrointestinal: Positive for abdominal pain. Negative for nausea and vomiting.   Genitourinary: Negative for difficulty urinating and dysuria.   Musculoskeletal: Negative for back pain and myalgias.   Skin: Negative for pallor and rash.   Neurological: Negative for dizziness, weakness, numbness and headaches.   Psychiatric/Behavioral: Negative for agitation and confusion.       Physical Exam     Initial Vitals [07/26/22 1114]   BP  Pulse Resp Temp SpO2   135/78 68 20 98.9 °F (37.2 °C) 99 %      MAP       --         Physical Exam    Nursing note and vitals reviewed.  Constitutional:   Alert, speaking full sentences, no acute distress   HENT:   Head: Normocephalic and atraumatic.   Mouth/Throat: Oropharynx is clear and moist.   Eyes: Conjunctivae and EOM are normal.   Cardiovascular: Normal rate, regular rhythm, normal heart sounds and intact distal pulses.   Pulmonary/Chest: Breath sounds normal. No stridor. No respiratory distress.   Abdominal:   Abdomen soft and nondistended.  Moderate severe left-sided abdominal tenderness, worse in the left upper quadrant   Musculoskeletal:         General: No tenderness or edema.     Neurological: She is alert and oriented to person, place, and time.   Skin: Skin is warm and dry. No rash noted.   Psychiatric: She has a normal mood and affect. Thought content normal.         ED Course   Procedures  Labs Reviewed   CBC W/ AUTO DIFFERENTIAL - Abnormal; Notable for the following components:       Result Value    RBC 3.68 (*)     Hemoglobin 11.6 (*)     Hematocrit 34.2 (*)     MCH 31.5 (*)     Platelets 92 (*)     Lymph # 0.6 (*)     Gran % 76.4 (*)     Lymph % 12.9 (*)     All other components within normal limits   COMPREHENSIVE METABOLIC PANEL - Abnormal; Notable for the following components:    CO2 19 (*)     Glucose 306 (*)     BUN 29 (*)     Albumin 3.2 (*)     Total Bilirubin 1.3 (*)     eGFR if  44 (*)     eGFR if non  38 (*)     All other components within normal limits   HEMOGLOBIN A1C - Abnormal; Notable for the following components:    Hemoglobin A1C 8.2 (*)     Estimated Avg Glucose 189 (*)     All other components within normal limits   TSH   HEMOGLOBIN A1C   TSH   SARS-COV-2 RDRP GENE    Narrative:     This test utilizes isothermal nucleic acid amplification   technology to detect the SARS-CoV-2 RdRp nucleic acid segment.   The analytical sensitivity (limit of  detection) is 125 genome   equivalents/mL.   A POSITIVE result implies infection with the SARS-CoV-2 virus;   the patient is presumed to be contagious.     A NEGATIVE result means that SARS-CoV-2 nucleic acids are not   present above the limit of detection. A NEGATIVE result should be   treated as presumptive. It does not rule out the possibility of   COVID-19 and should not be the sole basis for treatment decisions.   If COVID-19 is strongly suspected based on clinical and exposure   history, re-testing using an alternate molecular assay should be   considered.   This test is only for use under the Food and Drug   Administration s Emergency Use Authorization (EUA).   Commercial kits are provided by [employer].   Performance characteristics of the EUA have been independently   verified by [facility] Department of   Pathology and Laboratory Medicine.   _________________________________________________________________   The authorized Fact Sheet for Healthcare Providers and the authorized Fact   Sheet for Patients of the ID NOW COVID-19 are available on the FDA   website:     https://www.fda.gov/media/188536/download  https://www.fda.gov/media/086751/download               ECG Results    None       Imaging Results    None          Medications   sodium chloride 0.9% flush 5 mL (has no administration in time range)   melatonin tablet 9 mg (has no administration in time range)   senna-docusate 8.6-50 mg per tablet 1 tablet (has no administration in time range)   naloxone 0.4 mg/mL injection 0.02 mg (has no administration in time range)   LIDOcaine 5 % patch 1 patch (has no administration in time range)   glucose chewable tablet 16 g (has no administration in time range)   glucose chewable tablet 24 g (has no administration in time range)   glucagon (human recombinant) injection 1 mg (has no administration in time range)   enoxaparin injection 40 mg (40 mg Subcutaneous Given 7/26/22 1710)   ondansetron  disintegrating tablet 8 mg (has no administration in time range)   dextrose 10% bolus 125 mL (has no administration in time range)   dextrose 10% bolus 250 mL (has no administration in time range)   insulin aspart U-100 pen 1-10 Units (has no administration in time range)   citalopram tablet 40 mg (has no administration in time range)   diltiaZEM tablet 120 mg (120 mg Oral Given 7/26/22 1710)   insulin detemir U-100 pen 10 Units (has no administration in time range)   metoprolol succinate (TOPROL-XL) 24 hr tablet 25 mg (has no administration in time range)   pantoprazole EC tablet 40 mg (has no administration in time range)   atorvastatin tablet 40 mg (has no administration in time range)   spironolactone tablet 50 mg (has no administration in time range)   ibuprofen tablet 400 mg (400 mg Oral Given 7/26/22 1710)   ketorolac injection 9.999 mg (9.999 mg Intravenous Given 7/26/22 1238)     Medical Decision Making:   History:   Old Medical Records: I decided to obtain old medical records.  Old Records Summarized: records from clinic visits and records from previous admission(s).  Initial Assessment:   72 y.o. female with Atrial fibrillation, HCV, cirrhosis presents after being found with a splenic infarct on outside MRI  Suspect this is secondary to thromboembolism secondary to patient's atrial fibrillation, which has been managed without anticoagulation, patient is scheduled for a cardiac procedure to prevent further sequela of Afib  Abdomen soft nondistended, there is a fluid collection around the spleen, discussed with general surgery who recommended admission to hospital medicine  Patient given Toradol for pain    Clinical Tests:   Lab Tests: Ordered and Reviewed            Attending Attestation:   Physician Attestation Statement for Resident:  As the supervising MD   Physician Attestation Statement: I have personally seen and examined this patient.   I agree with the above history. -:   As the supervising MD I  agree with the above PE.    As the supervising MD I agree with the above treatment, course, plan, and disposition.                         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

## 2022-07-26 NOTE — PHARMACY MED REC
"Admission Medication History     The home medication history was taken by Lorna Amado CPhT.    Medication history obtained from, Patient's  Verified    You may go to "Admission" then "Reconcile Home Medications" tabs to review and/or act upon these items.      The home medication list has been updated by the Pharmacy department.    Please read ALL comments highlighted in yellow.    Please address this information as you see fit.     Feel free to contact us if you have any questions or require assistance.      The medications listed below were removed from the home medication list.  Please reorder if appropriate:  Patient reports no longer taking the following medication(s):   Carvedilol 12.5 mg   Invokamet 150-1,000 mg   Diclofenac 75 mg   Furosemide 20 mg   Irbesartan 150 mg   Actos 30 mg        Lorna Amado CPhT.  Ext [phone]                .          "

## 2022-07-26 NOTE — ASSESSMENT & PLAN NOTE
"Afib dx 6wks prior   Pt on Metoprolol 25mg qd (taken prn "for HR >80") as well as diltiazem 120mg ER qd  EKG from ED 7/25 w/ Afib RVR   EKG from admit pending    Plan:  Cnt home metoprolol and diltiazem   ECHO bubble study pending  EKG pending       "

## 2022-07-26 NOTE — ASSESSMENT & PLAN NOTE
Pt w/ sharp intermittent LUQ pain since 7/24   Afib dx 1 mo prior and not on anti-coag 2/2 cirrhosis and varices from Hep C   Presented to ED 7/25 for this issue and CT Abd/ Pelvis at that time which showed 71 mm hypodensity in the anterior inferior aspect of spleen  MRI performed outpt 7/26 w/ splenic infarct and partially occlusive DVT in distal splenic vein/main portal vein  Gen Surg consulted in ED for possible splenectomy  Anticoag started w/ lovenox after risk/benefits discussion     Plan  F/u Gen Surg recs  Cnt anticoag and monitor for signs of bleeding  ECHO bubble study pending  DVT US pending

## 2022-07-26 NOTE — ASSESSMENT & PLAN NOTE
A1C 8.2   Home meds include lantus 20u qhs and glipizde    Plan:  Monitor BG w/ goal 140-180  Hold oral medications  Insulin detemir 10u qhs w/ MDSS  Will adjust as needed

## 2022-07-26 NOTE — PLAN OF CARE
Pt. AAOx4. Family at bedside. Diet advanced, due to be NPO at MN. VSS. Echo, US, and EKG completed. Bed alarm on and call light in reach, Pt. Instructed to call for assistance.   Problem: Adult Inpatient Plan of Care  Goal: Plan of Care Review  Outcome: Ongoing, Progressing     Problem: Adult Inpatient Plan of Care  Goal: Patient-Specific Goal (Individualized)  Outcome: Ongoing, Progressing     Problem: Adult Inpatient Plan of Care  Goal: Absence of Hospital-Acquired Illness or Injury  Outcome: Ongoing, Progressing     Problem: Adult Inpatient Plan of Care  Goal: Optimal Comfort and Wellbeing  Outcome: Ongoing, Progressing     Problem: Adult Inpatient Plan of Care  Goal: Readiness for Transition of Care  Outcome: Ongoing, Progressing     Problem: Diabetes Comorbidity  Goal: Blood Glucose Level Within Targeted Range  Outcome: Ongoing, Progressing

## 2022-07-26 NOTE — HPI
Ms. Carl is a 72 y.o. 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 On 7/24, she noticed a sharp LUQ pain that has been intermittent since. She denies any associated events or trauma. She presented to an outside ED on 7/25 with this pain, and a subsequent outpatient MRI showed a DVT with a partially occlusive clot of the distal splenic vein/main portal vein and a splenic infarct. Upon read of MRI on 7/26, she was instructed to go to the ED for evaluation and was transferred to [facility] from [facility] ED. On arrival her LUQ pain is mild and intermittent. She denies any fever/chills or current nausea/vomiting. She had some mild nausea on Sunday but no vomiting. Her bowel movements have also been normal and not black or bloody. She had some palpitations a few times last week but none since this LUQ pain has started. She describes her lifestyle as sedentary since her Afib diagnosis 6 weeks ago but has no history of clots. She is not a candidate for anticoagulation due to a history of esophageal varices (monitored by EGD every 6 months). She stopped taking her aspirin and vitamins/supplements 1 wk ago due to a scheduled heart surgery (clip) next week to address her Afib. She only takes metoprolol when her heart rate is above 80-90 bpm.    In the ED, she was afebrile with a HR of 99, RR of 19, O2 sat of 99%, and BP of 155/70. EKG from 7/25 showed atrial fibrillation with rapid ventricular response and low voltage QRS. Labs from 7/26 are WBC 5.73, Hgb 11.6, Hct 34.2, HgbA1c 8.2, BUN 29 (baseline 27), Cr 1.4 (baseline 1.0), Glc 306. She was given ketorolac in the ED for pain. She is being admitted to the [facility] service with general surgery consult for splenic vein thrombosis and infarct.

## 2022-07-26 NOTE — NURSING
Pt. Requesting to ask hospital team about diet and that she is very hungry. Dr. Epstein notified. No new orders at this time.

## 2022-07-27 VITALS
RESPIRATION RATE: 18 BRPM | SYSTOLIC BLOOD PRESSURE: 111 MMHG | BODY MASS INDEX: 34.13 KG/M2 | DIASTOLIC BLOOD PRESSURE: 49 MMHG | WEIGHT: 199.94 LBS | TEMPERATURE: 98 F | OXYGEN SATURATION: 98 % | HEART RATE: 43 BPM | HEIGHT: 64 IN

## 2022-07-27 LAB
ALBUMIN SERPL BCP-MCNC: 3 G/DL (ref 3.5–5.2)
ALP SERPL-CCNC: 82 U/L (ref 55–135)
ALT SERPL W/O P-5'-P-CCNC: 14 U/L (ref 10–44)
ANION GAP SERPL CALC-SCNC: 10 MMOL/L (ref 8–16)
AST SERPL-CCNC: 17 U/L (ref 10–40)
BASOPHILS # BLD AUTO: 0.01 K/UL (ref 0–0.2)
BASOPHILS NFR BLD: 0.2 % (ref 0–1.9)
BILIRUB SERPL-MCNC: 1.3 MG/DL (ref 0.1–1)
BUN SERPL-MCNC: 27 MG/DL (ref 8–23)
CALCIUM SERPL-MCNC: 8.9 MG/DL (ref 8.7–10.5)
CHLORIDE SERPL-SCNC: 108 MMOL/L (ref 95–110)
CO2 SERPL-SCNC: 22 MMOL/L (ref 23–29)
CREAT SERPL-MCNC: 1.3 MG/DL (ref 0.5–1.4)
DIFFERENTIAL METHOD: ABNORMAL
EOSINOPHIL # BLD AUTO: 0.1 K/UL (ref 0–0.5)
EOSINOPHIL NFR BLD: 3.2 % (ref 0–8)
ERYTHROCYTE [DISTWIDTH] IN BLOOD BY AUTOMATED COUNT: 12.5 % (ref 11.5–14.5)
EST. GFR  (AFRICAN AMERICAN): 48 ML/MIN/1.73 M^2
EST. GFR  (NON AFRICAN AMERICAN): 41 ML/MIN/1.73 M^2
GLUCOSE SERPL-MCNC: 145 MG/DL (ref 70–110)
HCT VFR BLD AUTO: 35.2 % (ref 37–48.5)
HGB BLD-MCNC: 11.9 G/DL (ref 12–16)
IMM GRANULOCYTES # BLD AUTO: 0.01 K/UL (ref 0–0.04)
IMM GRANULOCYTES NFR BLD AUTO: 0.2 % (ref 0–0.5)
LYMPHOCYTES # BLD AUTO: 0.9 K/UL (ref 1–4.8)
LYMPHOCYTES NFR BLD: 20.1 % (ref 18–48)
MAGNESIUM SERPL-MCNC: 2.3 MG/DL (ref 1.6–2.6)
MCH RBC QN AUTO: 31.4 PG (ref 27–31)
MCHC RBC AUTO-ENTMCNC: 33.8 G/DL (ref 32–36)
MCV RBC AUTO: 93 FL (ref 82–98)
MONOCYTES # BLD AUTO: 0.4 K/UL (ref 0.3–1)
MONOCYTES NFR BLD: 10 % (ref 4–15)
NEUTROPHILS # BLD AUTO: 2.9 K/UL (ref 1.8–7.7)
NEUTROPHILS NFR BLD: 66.3 % (ref 38–73)
NRBC BLD-RTO: 0 /100 WBC
PHOSPHATE SERPL-MCNC: 3.5 MG/DL (ref 2.7–4.5)
PLATELET # BLD AUTO: 104 K/UL (ref 150–450)
PMV BLD AUTO: 10.1 FL (ref 9.2–12.9)
POCT GLUCOSE: 129 MG/DL (ref 70–110)
POCT GLUCOSE: 155 MG/DL (ref 70–110)
POCT GLUCOSE: 199 MG/DL (ref 70–110)
POCT GLUCOSE: 235 MG/DL (ref 70–110)
POTASSIUM SERPL-SCNC: 4.8 MMOL/L (ref 3.5–5.1)
PROT SERPL-MCNC: 6 G/DL (ref 6–8.4)
RBC # BLD AUTO: 3.79 M/UL (ref 4–5.4)
SODIUM SERPL-SCNC: 140 MMOL/L (ref 136–145)
WBC # BLD AUTO: 4.42 K/UL (ref 3.9–12.7)

## 2022-07-27 PROCEDURE — 83735 ASSAY OF MAGNESIUM: CPT | Performed by: STUDENT IN AN ORGANIZED HEALTH CARE EDUCATION/TRAINING PROGRAM

## 2022-07-27 PROCEDURE — 25000003 PHARM REV CODE 250: Performed by: STUDENT IN AN ORGANIZED HEALTH CARE EDUCATION/TRAINING PROGRAM

## 2022-07-27 PROCEDURE — 99203 OFFICE O/P NEW LOW 30 MIN: CPT | Mod: ,,, | Performed by: SURGERY

## 2022-07-27 PROCEDURE — 99203 PR OFFICE/OUTPT VISIT, NEW, LEVL III, 30-44 MIN: ICD-10-PCS | Mod: ,,, | Performed by: SURGERY

## 2022-07-27 PROCEDURE — G0378 HOSPITAL OBSERVATION PER HR: HCPCS

## 2022-07-27 PROCEDURE — 84100 ASSAY OF PHOSPHORUS: CPT | Performed by: STUDENT IN AN ORGANIZED HEALTH CARE EDUCATION/TRAINING PROGRAM

## 2022-07-27 PROCEDURE — 80053 COMPREHEN METABOLIC PANEL: CPT | Performed by: STUDENT IN AN ORGANIZED HEALTH CARE EDUCATION/TRAINING PROGRAM

## 2022-07-27 PROCEDURE — 36415 COLL VENOUS BLD VENIPUNCTURE: CPT | Performed by: STUDENT IN AN ORGANIZED HEALTH CARE EDUCATION/TRAINING PROGRAM

## 2022-07-27 PROCEDURE — 85025 COMPLETE CBC W/AUTO DIFF WBC: CPT | Performed by: STUDENT IN AN ORGANIZED HEALTH CARE EDUCATION/TRAINING PROGRAM

## 2022-07-27 RX ORDER — DILTIAZEM HYDROCHLORIDE 30 MG/1
120 TABLET, FILM COATED ORAL DAILY
Status: DISCONTINUED | OUTPATIENT
Start: 2022-07-28 | End: 2022-07-27

## 2022-07-27 RX ORDER — DILTIAZEM HYDROCHLORIDE 120 MG/1
120 CAPSULE, COATED, EXTENDED RELEASE ORAL DAILY
Status: DISCONTINUED | OUTPATIENT
Start: 2022-07-28 | End: 2022-07-27 | Stop reason: HOSPADM

## 2022-07-27 RX ADMIN — PANTOPRAZOLE SODIUM 40 MG: 40 TABLET, DELAYED RELEASE ORAL at 08:07

## 2022-07-27 RX ADMIN — SPIRONOLACTONE 50 MG: 25 TABLET, FILM COATED ORAL at 08:07

## 2022-07-27 RX ADMIN — CITALOPRAM HYDROBROMIDE 40 MG: 20 TABLET ORAL at 08:07

## 2022-07-27 RX ADMIN — DOCUSATE SODIUM - SENNOSIDES 1 TABLET: 50; 8.6 TABLET, FILM COATED ORAL at 08:07

## 2022-07-27 RX ADMIN — METOPROLOL SUCCINATE 25 MG: 25 TABLET, EXTENDED RELEASE ORAL at 08:07

## 2022-07-27 NOTE — PROGRESS NOTES
[facility] Medicine  Progress Note    Patient Name: Anushka Carl  MRN: [id number]  Patient Class: OP- Observation   Admission Date: 7/26/2022  Length of Stay: 0 days  Attending Physician: Axel Jaimes MD  Primary Care Provider: Nahum Hull MD        Subjective:     Principal Problem:Lesion of spleen        HPI:  Ms. Carl is a 72 y.o. 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 On 7/24, she noticed a sharp LUQ pain that has been intermittent since. She denies any associated events or trauma. She presented to an outside ED on 7/25 with this pain, and a subsequent outpatient MRI showed a DVT with a partially occlusive clot of the distal splenic vein/main portal vein and a splenic infarct. Upon read of MRI on 7/26, she was instructed to go to the ED for evaluation and was transferred to [facility] from [facility] ED. On arrival her LUQ pain is mild and intermittent. She denies any fever/chills or current nausea/vomiting. She had some mild nausea on Sunday but no vomiting. Her bowel movements have also been normal and not black or bloody. She had some palpitations a few times last week but none since this LUQ pain has started. She describes her lifestyle as sedentary since her Afib diagnosis 6 weeks ago but has no history of clots. She is not a candidate for anticoagulation due to a history of esophageal varices (monitored by EGD every 6 months). She stopped taking her aspirin and vitamins/supplements 1 wk ago due to a scheduled heart surgery (clip) next week to address her Afib. She only takes metoprolol when her heart rate is above 80-90 bpm.    In the ED, she was afebrile with a HR of 99, RR of 19, O2 sat of 99%, and BP of 155/70. EKG from 7/25 showed atrial fibrillation with rapid ventricular response and low voltage QRS. Labs from 7/26 are WBC 5.73, Hgb 11.6, Hct  34.2, HgbA1c 8.2, BUN 29 (baseline 27), Cr 1.4 (baseline 1.0), Glc 306. She was given ketorolac in the ED for pain. She is being admitted to the [facility] FM service with general surgery consult for splenic vein thrombosis and infarct.      Overview/Hospital Course:  No notes on file    Interval History: NAEON. Pt is feeling better this AM. She was made NPO at 0000 for possible splenectomy today with Gen Surg.     Review of Systems   Constitutional:  Negative for appetite change, chills and fever.   HENT:  Negative for rhinorrhea, sore throat and trouble swallowing.    Respiratory:  Negative for cough and shortness of breath.    Cardiovascular:  Negative for chest pain, palpitations and leg swelling.   Gastrointestinal:  Positive for abdominal pain. Negative for blood in stool, nausea and vomiting.        Sharp intermittent LUQ pain   Genitourinary:  Negative for difficulty urinating and dysuria.   Musculoskeletal:  Negative for myalgias.   Skin: Negative.    Allergic/Immunologic: Negative.    Neurological:  Negative for dizziness, weakness and headaches.   Hematological: Negative.    Objective:     Vital Signs (Most Recent):  Temp: 97.7 °F (36.5 °C) (07/27/22 0402)  Pulse: (!) 48 (07/27/22 0402)  Resp: 16 (07/27/22 0402)  BP: 99/61 (07/27/22 0402)  SpO2: 98 % (07/26/22 1922)   Vital Signs (24h Range):  Temp:  [97.2 °F (36.2 °C)-98.9 °F (37.2 °C)] 97.7 °F (36.5 °C)  Pulse:  [] 48  Resp:  [16-20] 16  SpO2:  [98 %-100 %] 98 %  BP: ()/(55-78) 99/61     Weight: 90.7 kg (199 lb 15.3 oz)  Body mass index is 34.32 kg/m².  No intake or output data in the 24 hours ending 07/27/22 0739   Physical Exam  Constitutional:       General: She is not in acute distress.     Appearance: She is obese. She is not ill-appearing.   HENT:      Head: Normocephalic and atraumatic.      Right Ear: External ear normal.      Left Ear: External ear normal.      Nose: Nose normal.   Eyes:      Extraocular Movements: Extraocular movements  intact.      Pupils: Pupils are equal, round, and reactive to light.   Cardiovascular:      Rate and Rhythm: Normal rate and regular rhythm.      Pulses: Normal pulses.      Heart sounds: Normal heart sounds.   Pulmonary:      Effort: Pulmonary effort is normal.      Breath sounds: Normal breath sounds.   Abdominal:      General: Bowel sounds are normal.      Palpations: Abdomen is soft.      Tenderness: There is abdominal tenderness.   Musculoskeletal:         General: Normal range of motion.      Cervical back: Normal range of motion.   Skin:     General: Skin is warm and dry.   Neurological:      General: No focal deficit present.      Mental Status: She is alert and oriented to person, place, and time. Mental status is at baseline.   Psychiatric:         Mood and Affect: Mood normal.         Behavior: Behavior normal.         Thought Content: Thought content normal.         Judgment: Judgment normal.       Recent Labs   Lab 07/25/22  1054 07/25/22  1305 07/26/22  1253 07/27/22  0454   WBC 5.73  --  4.64 4.42   HGB 12.5  --  11.6* 11.9*   HCT 37.5 36 34.2* 35.2*   MCV 95  --  93 93   RBC 3.96*  --  3.68* 3.79*   MCH 31.6*  --  31.5* 31.4*   MCHC 33.3  --  33.9 33.8   RDW 12.5  --  12.6 12.5   PLT 86*  --  92* 104*   MPV 10.4  --  10.2 10.1   GRAN 74.9*  4.3  --  76.4*  3.5 66.3  2.9   LYMPH 14.0*  0.8*  --  12.9*  0.6* 20.1  0.9*   MONO 9.6  0.6  --  8.8  0.4 10.0  0.4   EOSINOPHIL 1.0  --  1.5 3.2   BASOPHIL 0.2  --  0.2 0.2     Recent Labs   Lab 07/25/22  1054 07/26/22  1253 07/27/22  0454   * 137 140   K 5.4* 4.4 4.8    106 108   CO2 23 19* 22*   ANIONGAP 7* 12 10   BUN 28* 29* 27*   CREATININE 1.23 1.4 1.3   * 306* 145*   CALCIUM 9.0 9.3 8.9   PROT 7.2 7.0 6.0   ALBUMIN 4.1 3.2* 3.0*   ALKPHOS 87 103 82   BILITOT 2.1* 1.3* 1.3*   ALT 19 15 14   AST 29 20 17   ESTGFRAFRICA 51.0* 44* 48*   EGFRNONAA 44.2* 38* 41*   MG  --   --  2.3   PHOS  --   --  3.5     Recent Labs   Lab  07/25/22  1024   COLORU [place]*   APPEARANCEUA Hazy*   PHUR 6.0   SPECGRAV 1.020   PROTEINUA Trace*   GLUCUA Negative   KETONESU Trace*   BILIRUBINUA 1+*   OCCULTUA Trace*   UROBILINOGEN >=8.0*   NITRITE Negative   LEUKOCYTESUR Negative     No results for input(s): TROPONINI, CPK, CPKMB in the last 168 hours.  No results for input(s): PT, INR, APTT in the last 168 hours.  Recent Labs   Lab 07/26/22  1250   TSH 2.563     MRI Abdomen W WO Contrast    Result Date: 7/26/2022  EXAMINATION: MRI ABDOMEN W WO CONTRAST CLINICAL HISTORY: Splenomegaly, not elsewhere classified.  Left lower quadrant pain.  Abnormal abdominal CT, splenic lesion. TECHNIQUE: MR abdomen with and without contrast performed with multiplanar T1, in and out of phase, and T2 weighted sequences including dynamic post-contrast imaging.  10 cc Gadavist. COMPARISON: Abdominal CT with contrast 07/25/2022.  Abdominal ultrasound 03/21/2022. FINDINGS: As seen on the  CT scan yesterday, there is a nonenhancing geographic area of signal alteration involving the lower aspect of the spleen, 6 6.9 x 4.2 x 6.3 cm, with isointense T1 and predominantly homogeneous hyperintense T2 signal with peripheral intermediate signal.  Again, there is a vessel seen coursing through the inferior medial margin of the signal alteration.  Again, there is trace aparna-splenic fluid.  Again, there is mild splenomegaly , 12.7 x 6 x 13.1 cm.  Looking back at the prior abdominal ultrasound, March 2022, the spleen demonstrated normal homogeneous echogenicity without mass.  These imaging features represent splenic infarction sequela.  The morphology of the signal alteration, lack of peripheral rim enhancement, and the vessel coursing through the signal alteration would make a large splenic abscess very unlikely. Additionally, there is concomitant deep venous thrombosis with partially occlusive filling defect involving the distal splenic vein and main portal vein, same pattern as on  yesterday's CT exam.  The SMV is patent. Again, nodular liver surface contour characteristic of cirrhosis.  The liver demonstrates homogeneous signal intensity and enhancement without mass. Again, the gallbladder is distended with a large gallstone, 2.8 cm, and a second 0.8 cm stone.  No evidence of gallbladder inflammation.  The common hepatic duct/common bile duct is dilated, up to 10 mm, with normal tapering of the distal common bile duct.  There is no intrahepatic ductal dilatation.  No evidence of a intraductal filling defect, dedicated MRCP imaging not performed.  No pancreatic duct dilatation. Pancreas, adrenal glands, and kidneys are normal. Normal caliber aorta.  There is no lymphadenopathy.     Mild splenomegaly with superimposed splenic infarction with nonenhancing mildly complex fluid collection anterior inferior aspect of the spleen, 7 x 4 x 6 cm, with minimal aparna-splenic fluid in the setting of deep venous thrombosis with partially occlusive clot distal splenic vein/main portal vein. Cirrhosis.  Negative for liver mass. Cholelithiasis. COMMUNICATION This critical result was discovered/received at 08:45 hours.  The critical information above was relayed directly by me by telephone to emergency department physician, Dr. Fuller, on 07/26/2022 at 08:50 hours. Electronically signed by: Cesar Giles MD Date:    07/26/2022 Time:    09:02    CT Abdomen Pelvis With Contrast    Result Date: 7/25/2022  EXAMINATION: CT ABDOMEN PELVIS WITH CONTRAST CLINICAL HISTORY: LLQ abdominal pain; TECHNIQUE: Standard abdomen and pelvis CT protocol with IV contrast was performed.  100 mL of Omnipaque 350 contrast material was used for this examination.  There was no oral contrast administered. COMPARISON: An ultrasound examination of the abdomen performed on 03/21/2022. FINDINGS: Finding: The size of the heart is within normal limits.  There is an 8 mm noncalcified pulmonary nodule in the left lower lobe.  There are mild  dependent atelectatic changes in both lungs.  There is no pneumothorax or pleural effusion. The liver has a nodular surface.  There is a 29 mm stone in the dependent portion of the gallbladder.  There is moderate generalized atrophy of the pancreas.  There is a 71 mm area of hypodensity in the anterior inferior aspect of the spleen.  There are mild inflammatory changes adjacent to the inferior aspect of the spleen.  The adrenals and kidneys are normal in appearance. The ureters and the urinary bladder are normal in appearance.  The uterus is absent.  The appendix is normal in appearance.  There is a marked amount of diverticulosis throughout the majority of the colon.  There are findings characteristic of a moderate size diverticulum off of the left side of the 2nd portion of the duodenum.  There are findings characteristic of a small diverticulum off of the superior aspect of the 3rd portion of the duodenum.  There is a tiny amount of free fluid within the pelvis.  There is no pneumoperitoneum.  There is a moderate amount of atherosclerosis.     1. There is a 71 mm area of hypodensity in the anterior inferior aspect of the spleen.  There are mild inflammatory changes adjacent to the inferior aspect of the spleen.  Hence common infectious process cannot be excluded.  If additional imaging evaluation is clinically indicated, I recommend consideration of an MRI examination of the abdomen without and with IV contrast. 2. There is a 29 mm stone in the dependent portion of the gallbladder. 3. The liver has a nodular surface.  This is characteristic of hepatic cirrhosis. 4. There is an 8 mm noncalcified pulmonary nodule in the left lower lobe.  I recommend consideration of a follow-up CT examination of the thorax without IV contrast in 4 months. 5. There is a marked amount of diverticulosis throughout the majority of the colon. There are findings characteristic of a moderate size diverticulum off of the left side of the  2nd portion of the duodenum.  There are findings characteristic of a small diverticulum off of the superior aspect of the 3rd portion of the duodenum. All CT scans at this facility use dose modulation, iterative reconstruction, and/or weight base dosing when appropriate to reduce radiation dose when appropriate to reduce radiation dose to as low as reasonably achievable. Electronically signed by: Jered Vicente MD Date:    07/25/2022 Time:    12:16    US Lower Extremity Veins Bilateral    Result Date: 7/26/2022  EXAMINATION: US LOWER EXTREMITY VEINS BILATERAL CLINICAL HISTORY: afib; TECHNIQUE: Duplex and color flow Doppler and dynamic compression was performed of the bilateral lower extremity veins was performed. COMPARISON: None FINDINGS: Right thigh veins: The common femoral, femoral, popliteal, upper greater saphenous, and deep femoral veins are patent and free of thrombus. The veins are normally compressible and have normal phasic flow and augmentation response. Right calf veins: The visualized calf veins are patent. Left thigh veins: The common femoral, femoral, popliteal, upper greater saphenous, and deep femoral veins are patent and free of thrombus. The veins are normally compressible and have normal phasic flow and augmentation response. Left calf veins: The visualized calf veins are patent. Miscellaneous: There is bilateral thigh edema.     No evidence of deep venous thrombosis in either lower extremity. Electronically signed by: Clifford Masterson Date:    07/26/2022 Time:    19:54    Echo Saline Bubble? Yes    Result Date: 7/26/2022  · Limited visualization for bubble study. There is no evidence of intracardiac shunting. · Concentric remodeling and low normal systolic function. · The estimated ejection fraction is 50%. · Normal left ventricular diastolic function. · Normal right ventricular size with normal right ventricular systolic function. · Mild to moderate pulmonic regurgitation. · Normal central  venous pressure (3 mmHg). · The estimated PA systolic pressure is 20 mmHg.      Microbiology Results (last 7 days)       ** No results found for the last 168 hours. **                Assessment/Plan:      * Lesion of spleen  Pt w/ sharp intermittent LUQ pain since 7/24   Afib dx 1 mo prior and not on anti-coag 2/2 cirrhosis and varices from Hep C   Presented to ED 7/25 for this issue and CT Abd/ Pelvis at that time which showed 71 mm hypodensity in the anterior inferior aspect of spleen  MRI performed outpt 7/26 w/ splenic infarct and partially occlusive DVT in distal splenic vein/main portal vein  Gen Surg consulted in ED for possible splenectomy  Anticoag started w/ lovenox after risk/benefits discussion   ECHO w/ bubble w/ no signs of shunting and EF 50%  DVT US w/o signs of thombosis    Plan  F/u Gen Surg recs  Cnt anticoag and monitor for signs of bleeding        Hepatitis C  Hx of Hep C from blood transfusion in '70  Cirrhosis and varices subsequently  Treated w/ epclusa >10yrs ago  LFTs on admit wnl  T bili slightly elevated to 1.3   Albumin decreased to 3.2    Plan:  Cnt to monitor liver fxn and variceal bleeding while on anticoag      Anxiety  Cnt home citalopram 40mg qd       Type 2 diabetes mellitus  A1C 8.2   Home meds include lantus 20u qhs and glipizde    Plan:  Monitor BG w/ goal 140-180  Hold oral medications  Insulin detemir 10u qhs w/ MDSS  Will adjust as needed      Stage 3 chronic kidney disease  BUN/Crt on admit 29/1.4 w/ BL 27/1 though still CKD3    Plan:  Cnt to monitor renal fxn  Avoid Nephrotoxins and renally dose meds      Hyperlipidemia  Home meds include Crestor 5 mg    Plan:  Switch to atorvastatin on formulary while inpt      Essential hypertension  Normotensive on admit 135/78  Home meds Diltiazem, Spironolactone 50mg qd, and metoprolol      Plan:  Cnt home meds  Cnt to monitor for HTN  Hydralazine PRN for SPB >180    Atrial fibrillation  Afib dx 6wks prior   Pt on Metoprolol 25mg qd  "(taken prn "for HR >80") as well as diltiazem 120mg ER qd  EKG from ED 7/25 w/ Afib RVR   EKG from admit NSR HR 96    Plan:  Cnt home metoprolol and diltiazem             VTE Risk Mitigation (From admission, onward)         Ordered     enoxaparin injection 40 mg  Daily         07/26/22 1304     IP VTE HIGH RISK PATIENT  Once         07/26/22 1304     Place sequential compression device  Until discontinued         07/26/22 1304                Discharge Planning   REN:      Code Status: Full Code   Is the patient medically ready for discharge?:     Reason for patient still in hospital (select all that apply): Patient trending condition           Lucian Reyes MD  Department of Hospital Medicine   [facility] Surg    "

## 2022-07-27 NOTE — SUBJECTIVE & OBJECTIVE
Interval History: NAEON. Pt is feeling better this AM. She was made NPO at 0000 for possible splenectomy today with Gen Surg.     Review of Systems   Constitutional:  Negative for appetite change, chills and fever.   HENT:  Negative for rhinorrhea, sore throat and trouble swallowing.    Respiratory:  Negative for cough and shortness of breath.    Cardiovascular:  Negative for chest pain, palpitations and leg swelling.   Gastrointestinal:  Positive for abdominal pain. Negative for blood in stool, nausea and vomiting.        Sharp intermittent LUQ pain   Genitourinary:  Negative for difficulty urinating and dysuria.   Musculoskeletal:  Negative for myalgias.   Skin: Negative.    Allergic/Immunologic: Negative.    Neurological:  Negative for dizziness, weakness and headaches.   Hematological: Negative.    Objective:     Vital Signs (Most Recent):  Temp: 97.7 °F (36.5 °C) (07/27/22 0402)  Pulse: (!) 48 (07/27/22 0402)  Resp: 16 (07/27/22 0402)  BP: 99/61 (07/27/22 0402)  SpO2: 98 % (07/26/22 1922)   Vital Signs (24h Range):  Temp:  [97.2 °F (36.2 °C)-98.9 °F (37.2 °C)] 97.7 °F (36.5 °C)  Pulse:  [] 48  Resp:  [16-20] 16  SpO2:  [98 %-100 %] 98 %  BP: ()/(55-78) 99/61     Weight: 90.7 kg (199 lb 15.3 oz)  Body mass index is 34.32 kg/m².  No intake or output data in the 24 hours ending 07/27/22 0739   Physical Exam  Constitutional:       General: She is not in acute distress.     Appearance: She is obese. She is not ill-appearing.   HENT:      Head: Normocephalic and atraumatic.      Right Ear: External ear normal.      Left Ear: External ear normal.      Nose: Nose normal.   Eyes:      Extraocular Movements: Extraocular movements intact.      Pupils: Pupils are equal, round, and reactive to light.   Cardiovascular:      Rate and Rhythm: Normal rate and regular rhythm.      Pulses: Normal pulses.      Heart sounds: Normal heart sounds.   Pulmonary:      Effort: Pulmonary effort is normal.      Breath sounds:  Normal breath sounds.   Abdominal:      General: Bowel sounds are normal.      Palpations: Abdomen is soft.      Tenderness: There is abdominal tenderness.   Musculoskeletal:         General: Normal range of motion.      Cervical back: Normal range of motion.   Skin:     General: Skin is warm and dry.   Neurological:      General: No focal deficit present.      Mental Status: She is alert and oriented to person, place, and time. Mental status is at baseline.   Psychiatric:         Mood and Affect: Mood normal.         Behavior: Behavior normal.         Thought Content: Thought content normal.         Judgment: Judgment normal.       Recent Labs   Lab 07/25/22  1054 07/25/22  1305 07/26/22  1253 07/27/22  0454   WBC 5.73  --  4.64 4.42   HGB 12.5  --  11.6* 11.9*   HCT 37.5 36 34.2* 35.2*   MCV 95  --  93 93   RBC 3.96*  --  3.68* 3.79*   MCH 31.6*  --  31.5* 31.4*   MCHC 33.3  --  33.9 33.8   RDW 12.5  --  12.6 12.5   PLT 86*  --  92* 104*   MPV 10.4  --  10.2 10.1   GRAN 74.9*  4.3  --  76.4*  3.5 66.3  2.9   LYMPH 14.0*  0.8*  --  12.9*  0.6* 20.1  0.9*   MONO 9.6  0.6  --  8.8  0.4 10.0  0.4   EOSINOPHIL 1.0  --  1.5 3.2   BASOPHIL 0.2  --  0.2 0.2     Recent Labs   Lab 07/25/22  1054 07/26/22  1253 07/27/22  0454   * 137 140   K 5.4* 4.4 4.8    106 108   CO2 23 19* 22*   ANIONGAP 7* 12 10   BUN 28* 29* 27*   CREATININE 1.23 1.4 1.3   * 306* 145*   CALCIUM 9.0 9.3 8.9   PROT 7.2 7.0 6.0   ALBUMIN 4.1 3.2* 3.0*   ALKPHOS 87 103 82   BILITOT 2.1* 1.3* 1.3*   ALT 19 15 14   AST 29 20 17   ESTGFRAFRICA 51.0* 44* 48*   EGFRNONAA 44.2* 38* 41*   MG  --   --  2.3   PHOS  --   --  3.5     Recent Labs   Lab 07/25/22  1024   COLORU [place]*   APPEARANCEUA Hazy*   PHUR 6.0   SPECGRAV 1.020   PROTEINUA Trace*   GLUCUA Negative   KETONESU Trace*   BILIRUBINUA 1+*   OCCULTUA Trace*   UROBILINOGEN >=8.0*   NITRITE Negative   LEUKOCYTESUR Negative     No results for input(s): TROPONINI, CPK, CPKMB in  the last 168 hours.  No results for input(s): PT, INR, APTT in the last 168 hours.  Recent Labs   Lab 07/26/22  1250   TSH 2.563     MRI Abdomen W WO Contrast    Result Date: 7/26/2022  EXAMINATION: MRI ABDOMEN W WO CONTRAST CLINICAL HISTORY: Splenomegaly, not elsewhere classified.  Left lower quadrant pain.  Abnormal abdominal CT, splenic lesion. TECHNIQUE: MR abdomen with and without contrast performed with multiplanar T1, in and out of phase, and T2 weighted sequences including dynamic post-contrast imaging.  10 cc Gadavist. COMPARISON: Abdominal CT with contrast 07/25/2022.  Abdominal ultrasound 03/21/2022. FINDINGS: As seen on the  CT scan yesterday, there is a nonenhancing geographic area of signal alteration involving the lower aspect of the spleen, 6 6.9 x 4.2 x 6.3 cm, with isointense T1 and predominantly homogeneous hyperintense T2 signal with peripheral intermediate signal.  Again, there is a vessel seen coursing through the inferior medial margin of the signal alteration.  Again, there is trace aparna-splenic fluid.  Again, there is mild splenomegaly , 12.7 x 6 x 13.1 cm.  Looking back at the prior abdominal ultrasound, March 2022, the spleen demonstrated normal homogeneous echogenicity without mass.  These imaging features represent splenic infarction sequela.  The morphology of the signal alteration, lack of peripheral rim enhancement, and the vessel coursing through the signal alteration would make a large splenic abscess very unlikely. Additionally, there is concomitant deep venous thrombosis with partially occlusive filling defect involving the distal splenic vein and main portal vein, same pattern as on yesterday's CT exam.  The SMV is patent. Again, nodular liver surface contour characteristic of cirrhosis.  The liver demonstrates homogeneous signal intensity and enhancement without mass. Again, the gallbladder is distended with a large gallstone, 2.8 cm, and a second 0.8 cm stone.  No  evidence of gallbladder inflammation.  The common hepatic duct/common bile duct is dilated, up to 10 mm, with normal tapering of the distal common bile duct.  There is no intrahepatic ductal dilatation.  No evidence of a intraductal filling defect, dedicated MRCP imaging not performed.  No pancreatic duct dilatation. Pancreas, adrenal glands, and kidneys are normal. Normal caliber aorta.  There is no lymphadenopathy.     Mild splenomegaly with superimposed splenic infarction with nonenhancing mildly complex fluid collection anterior inferior aspect of the spleen, 7 x 4 x 6 cm, with minimal aparna-splenic fluid in the setting of deep venous thrombosis with partially occlusive clot distal splenic vein/main portal vein. Cirrhosis.  Negative for liver mass. Cholelithiasis. COMMUNICATION This critical result was discovered/received at 08:45 hours.  The critical information above was relayed directly by me by telephone to emergency department physician, Dr. Fuller, on 07/26/2022 at 08:50 hours. Electronically signed by: Cesar iGles MD Date:    07/26/2022 Time:    09:02    CT Abdomen Pelvis With Contrast    Result Date: 7/25/2022  EXAMINATION: CT ABDOMEN PELVIS WITH CONTRAST CLINICAL HISTORY: LLQ abdominal pain; TECHNIQUE: Standard abdomen and pelvis CT protocol with IV contrast was performed.  100 mL of Omnipaque 350 contrast material was used for this examination.  There was no oral contrast administered. COMPARISON: An ultrasound examination of the abdomen performed on 03/21/2022. FINDINGS: Finding: The size of the heart is within normal limits.  There is an 8 mm noncalcified pulmonary nodule in the left lower lobe.  There are mild dependent atelectatic changes in both lungs.  There is no pneumothorax or pleural effusion. The liver has a nodular surface.  There is a 29 mm stone in the dependent portion of the gallbladder.  There is moderate generalized atrophy of the pancreas.  There is a 71 mm area of hypodensity in the  anterior inferior aspect of the spleen.  There are mild inflammatory changes adjacent to the inferior aspect of the spleen.  The adrenals and kidneys are normal in appearance. The ureters and the urinary bladder are normal in appearance.  The uterus is absent.  The appendix is normal in appearance.  There is a marked amount of diverticulosis throughout the majority of the colon.  There are findings characteristic of a moderate size diverticulum off of the left side of the 2nd portion of the duodenum.  There are findings characteristic of a small diverticulum off of the superior aspect of the 3rd portion of the duodenum.  There is a tiny amount of free fluid within the pelvis.  There is no pneumoperitoneum.  There is a moderate amount of atherosclerosis.     1. There is a 71 mm area of hypodensity in the anterior inferior aspect of the spleen.  There are mild inflammatory changes adjacent to the inferior aspect of the spleen.  Hence common infectious process cannot be excluded.  If additional imaging evaluation is clinically indicated, I recommend consideration of an MRI examination of the abdomen without and with IV contrast. 2. There is a 29 mm stone in the dependent portion of the gallbladder. 3. The liver has a nodular surface.  This is characteristic of hepatic cirrhosis. 4. There is an 8 mm noncalcified pulmonary nodule in the left lower lobe.  I recommend consideration of a follow-up CT examination of the thorax without IV contrast in 4 months. 5. There is a marked amount of diverticulosis throughout the majority of the colon. There are findings characteristic of a moderate size diverticulum off of the left side of the 2nd portion of the duodenum.  There are findings characteristic of a small diverticulum off of the superior aspect of the 3rd portion of the duodenum. All CT scans at this facility use dose modulation, iterative reconstruction, and/or weight base dosing when appropriate to reduce radiation dose  when appropriate to reduce radiation dose to as low as reasonably achievable. Electronically signed by: Jered Vicente MD Date:    07/25/2022 Time:    12:16    US Lower Extremity Veins Bilateral    Result Date: 7/26/2022  EXAMINATION: US LOWER EXTREMITY VEINS BILATERAL CLINICAL HISTORY: afib; TECHNIQUE: Duplex and color flow Doppler and dynamic compression was performed of the bilateral lower extremity veins was performed. COMPARISON: None FINDINGS: Right thigh veins: The common femoral, femoral, popliteal, upper greater saphenous, and deep femoral veins are patent and free of thrombus. The veins are normally compressible and have normal phasic flow and augmentation response. Right calf veins: The visualized calf veins are patent. Left thigh veins: The common femoral, femoral, popliteal, upper greater saphenous, and deep femoral veins are patent and free of thrombus. The veins are normally compressible and have normal phasic flow and augmentation response. Left calf veins: The visualized calf veins are patent. Miscellaneous: There is bilateral thigh edema.     No evidence of deep venous thrombosis in either lower extremity. Electronically signed by: Clifford Masterson Date:    07/26/2022 Time:    19:54    Echo Saline Bubble? Yes    Result Date: 7/26/2022  · Limited visualization for bubble study. There is no evidence of intracardiac shunting. · Concentric remodeling and low normal systolic function. · The estimated ejection fraction is 50%. · Normal left ventricular diastolic function. · Normal right ventricular size with normal right ventricular systolic function. · Mild to moderate pulmonic regurgitation. · Normal central venous pressure (3 mmHg). · The estimated PA systolic pressure is 20 mmHg.      Microbiology Results (last 7 days)       ** No results found for the last 168 hours. **

## 2022-07-27 NOTE — HOSPITAL COURSE
Patient made NPO pending general surgery consult. EKG 7/26 showed normal sinus rhythm. LE U/S 7/26 showed no evidence of DVT. TTE 7/26 showed no evidence of intracardiac shunting, low normal systolic function, EF 50%, and mild/mod pulmonic regurgitation.General surgery found no need for splenectomy at this time. Pt was educated on her condition and given risk benefits of anticoagulation to which pt is is unable to receive 2/2 liver cirrhosis and varices. She was given strict ED precautions and close follow up.

## 2022-07-27 NOTE — CONSULTS
[facility]  INPATIENT H&P    REASON FOR CONSULT/ADMISSION: Splenic infarct    HPI: Anushka Carl is a 72 y.o. female with multiple medical issues including A fib and HCV cirrhosis.  Presents due to LUQ abdominal pain x several days.  CT and MRI reviewed which show evidence of splenic infarct and partial venous occlusion of portal vein and splenic vein.  Patient's pain is improving.  No evidence of splenic abscess.  Pt had not been on anti-coagulation due to hx of esophageal varices.     I have reviewed the patient's chart including prior progress notes, procedures and testing.     ROS:   Review of Systems   All other systems reviewed and are negative.      PROBLEM LIST:  Patient Active Problem List   Diagnosis    Tachycardia    Dehydration    LUQ abdominal pain    Lesion of spleen    Atrial fibrillation    Cirrhosis of liver    Essential hypertension    Hyperlipidemia    Stage 3 chronic kidney disease    Type 2 diabetes mellitus    Anxiety    Hepatitis C         HISTORY  No past medical history on file.    Past Surgical History:   Procedure Laterality Date    HYSTERECTOMY      OOPHORECTOMY              Family History   Problem Relation Age of Onset    Breast cancer Sister          MEDS:  No current facility-administered medications on file prior to encounter.     Current Outpatient Medications on File Prior to Encounter   Medication Sig Dispense Refill    ascorbic acid, vitamin C, (VITAMIN C) 100 MG tablet Take 100 mg by mouth once daily.      calcium carbonate (OS-ROMY) 500 mg calcium (1,250 mg) tablet Take 1 tablet by mouth once daily.      cholecalciferol, vitamin D3, 10 mcg (400 unit) Chew Take 1 tablet by mouth once daily.      cinnamon bark 500 mg capsule Take 1,000 mg by mouth once daily.      citalopram (CELEXA) 40 MG tablet Take 40 mg by mouth once daily.      diltiaZEM (CARDIZEM CD) 120 MG Cp24 Take 120 mg by mouth once daily.      ferrous sulfate 324 mg (65 mg iron) TbEC  Take 324 mg by mouth once daily.      fluorometholone 0.1% (FML) 0.1 % DrpS Place 1 drop into the left eye once daily.      glipiZIDE (GLUCOTROL) 5 MG tablet Take 5 mg by mouth once daily at 6am.      ibuprofen (ADVIL,MOTRIN) 200 MG tablet Take 200 mg by mouth every 6 (six) hours as needed for Pain.      LANTUS SOLOSTAR U-100 INSULIN glargine 100 units/mL SubQ pen Inject 20 Units into the skin every evening.      metoprolol succinate (TOPROL-XL) 25 MG 24 hr tablet Take 25 mg by mouth daily as needed.      multivitamin capsule Take 1 capsule by mouth once daily.      omega-3 fatty acids 1,000 mg Cap Take 1 g by mouth once daily.      pantoprazole (PROTONIX) 40 MG tablet Take 40 mg by mouth once daily.      rosuvastatin (CRESTOR) 5 MG tablet 5 mg once daily.      spironolactone (ALDACTONE) 50 MG tablet Take 50 mg by mouth once daily.      [DISCONTINUED] aspirin (ECOTRIN) 81 MG EC tablet Take 81 mg by mouth once daily.         ALLERGIES:  Review of patient's allergies indicates:   Allergen Reactions    Codeine Swelling    Penicillins Rash         VITALS:  Temp:  [96.9 °F (36.1 °C)-98.4 °F (36.9 °C)] 96.9 °F (36.1 °C)  Pulse:  [] 50  Resp:  [16-19] 18  SpO2:  [98 %-100 %] 98 %  BP: ()/(55-70) 111/63    No intake/output data recorded.      PHYSICAL EXAM:  Physical Exam  Constitutional:       General: She is not in acute distress.     Appearance: Normal appearance. She is obese. She is not ill-appearing or toxic-appearing.   HENT:      Head: Normocephalic and atraumatic.   Pulmonary:      Effort: Pulmonary effort is normal.   Abdominal:      Palpations: Abdomen is soft.      Tenderness: There is no abdominal tenderness. There is no guarding or rebound.   Skin:     General: Skin is warm and dry.   Neurological:      Mental Status: Mental status is at baseline.   Psychiatric:         Mood and Affect: Mood normal.         Behavior: Behavior normal.           LABS:  Lab Results   Component Value Date     WBC 4.42 07/27/2022    RBC 3.79 (L) 07/27/2022    HGB 11.9 (L) 07/27/2022    HCT 35.2 (L) 07/27/2022    HCT 36 07/25/2022     (L) 07/27/2022     Lab Results   Component Value Date     (H) 07/27/2022     07/27/2022    K 4.8 07/27/2022     07/27/2022    CO2 22 (L) 07/27/2022    BUN 27 (H) 07/27/2022    CREATININE 1.3 07/27/2022    CALCIUM 8.9 07/27/2022     Lab Results   Component Value Date    ALT 14 07/27/2022    AST 17 07/27/2022    ALKPHOS 82 07/27/2022    BILITOT 1.3 (H) 07/27/2022     Lab Results   Component Value Date    MG 2.3 07/27/2022    PHOS 3.5 07/27/2022       STUDIES:  CT , MRI images and reports were personally reviewed.        ASSESSMENT & PLAN:  72 y.o. 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  No indication for splenectomy at present time.  Will follow.      Paul Zimmer M.D., F.A.C.S.  [facility]  [facility]

## 2022-07-27 NOTE — DISCHARGE SUMMARY
[facility] Medicine  Discharge Summary      Patient Name: Anushka Carl  MRN: [id number]  Patient Class: OP- Observation  Admission Date: 7/26/2022  Hospital Length of Stay: 0 days  Discharge Date and Time:  07/27/2022 6:10 PM  Attending Physician: No att. providers found   Discharging Provider: Lucian Reyes MD  Primary Care Provider: Nahum Hull MD      HPI:   Ms. Carl is a 72 y.o. 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 On 7/24, she noticed a sharp LUQ pain that has been intermittent since. She denies any associated events or trauma. She presented to an outside ED on 7/25 with this pain, and a subsequent outpatient MRI showed a DVT with a partially occlusive clot of the distal splenic vein/main portal vein and a splenic infarct. Upon read of MRI on 7/26, she was instructed to go to the ED for evaluation and was transferred to [facility] from [facility] ED. On arrival her LUQ pain is mild and intermittent. She denies any fever/chills or current nausea/vomiting. She had some mild nausea on Sunday but no vomiting. Her bowel movements have also been normal and not black or bloody. She had some palpitations a few times last week but none since this LUQ pain has started. She describes her lifestyle as sedentary since her Afib diagnosis 6 weeks ago but has no history of clots. She is not a candidate for anticoagulation due to a history of esophageal varices (monitored by EGD every 6 months). She stopped taking her aspirin and vitamins/supplements 1 wk ago due to a scheduled heart surgery (clip) next week to address her Afib. She only takes metoprolol when her heart rate is above 80-90 bpm.    In the ED, she was afebrile with a HR of 99, RR of 19, O2 sat of 99%, and BP of 155/70. EKG from 7/25 showed atrial fibrillation with rapid ventricular response and low voltage  QRS. Labs from 7/26 are WBC 5.73, Hgb 11.6, Hct 34.2, HgbA1c 8.2, BUN 29 (baseline 27), Cr 1.4 (baseline 1.0), Glc 306. She was given ketorolac in the ED for pain. She is being admitted to the [facility] FM service with general surgery consult for splenic vein thrombosis and infarct.      * No surgery found *      Hospital Course:   Patient made NPO pending general surgery consult. EKG 7/26 showed normal sinus rhythm. LE U/S 7/26 showed no evidence of DVT. TTE 7/26 showed no evidence of intracardiac shunting, low normal systolic function, EF 50%, and mild/mod pulmonic regurgitation.General surgery found no need for splenectomy at this time. Pt was educated on her condition and given risk benefits of anticoagulation to which pt is is unable to receive 2/2 liver cirrhosis and varices. She was given strict ED precautions and close follow up.        Goals of Care Treatment Preferences:  Code Status: Full Code      Consults:   Consults (From admission, onward)        Status Ordering Provider     Inpatient consult to General surgery  Once        Provider:  (Not yet assigned)    Completed LALIT HUERTAS          No new Assessment & Plan notes have been filed under this hospital service since the last note was generated.  Service: Hospital Medicine    Final Active Diagnoses:    Diagnosis Date Noted POA    PRINCIPAL PROBLEM:  Lesion of spleen [D73.89] 07/25/2022 Yes    Anxiety [F41.9] 07/26/2022 Unknown    Hepatitis C [B19.20] 07/26/2022 Unknown    Atrial fibrillation [I48.91] 05/04/2022 Yes    Hyperlipidemia [E78.5] 05/04/2022 Yes    Essential hypertension [I10] 01/02/2021 Yes    Stage 3 chronic kidney disease [N18.30] 01/02/2021 Yes    Type 2 diabetes mellitus [E11.9] 01/02/2021 Yes      Problems Resolved During this Admission:       Discharged Condition: stable    Disposition: Home or Self Care    Follow Up:   Follow-up Information     Nahum Hull MD. Go in 1 week(s).    Specialty: Family Medicine  Why: A follow up  appointment have been requested on your behalf. You will receive confirmation with appointment date and time.  Contact information:  [address]  [address]  [place] [address]  [phone]             Surinder Proctor PA-C. Schedule an appointment as soon as possible for a visit in 1 week(s).    Specialty: Family Medicine  Why: hospital follow up  Contact information:  [address]  [address]  [place] [address]  [phone]             Nahum Caputo MD. Go in 1 week(s).    Specialty: Cardiothoracic Surgery  Why: A follow up appointment has been made on your behalf. You will receive a call with appointment date and time.  Contact information:  [address] [place] [address]  [phone]                       Patient Instructions:      Ambulatory referral/consult to General Surgery   Standing Status: Future   Referral Priority: Routine Referral Type: Consultation   Referral Reason: Specialty Services Required   Requested Specialty: General Surgery   Number of Visits Requested: 1     Diet Cardiac     Diet Adult Regular     Notify your health care provider if you experience any of the following:  temperature >100.4     Notify your health care provider if you experience any of the following:  persistent nausea and vomiting or diarrhea     Notify your health care provider if you experience any of the following:  severe uncontrolled pain     Notify your health care provider if you experience any of the following:  redness, tenderness, or signs of infection (pain, swelling, redness, odor or green/yellow discharge around incision site)     Notify your health care provider if you experience any of the following:  difficulty breathing or increased cough     Notify your health care provider if you experience any of the following:  severe persistent headache     Notify your health care provider if you experience any of the following:  worsening rash     Notify your health care provider if you experience any  of the following:  persistent dizziness, light-headedness, or visual disturbances     Notify your health care provider if you experience any of the following:  increased confusion or weakness     Notify your health care provider if you experience any of the following:  temperature >100.4     Notify your health care provider if you experience any of the following:  redness, tenderness, or signs of infection (pain, swelling, redness, odor or green/yellow discharge around incision site)     Notify your health care provider if you experience any of the following:  persistent nausea and vomiting or diarrhea     Activity as tolerated     Activity as tolerated       Significant Diagnostic Studies:   Recent Labs   Lab 07/25/22  1054 07/25/22  1305 07/26/22  1253 07/27/22  0454   WBC 5.73  --  4.64 4.42   HGB 12.5  --  11.6* 11.9*   HCT 37.5 36 34.2* 35.2*   MCV 95  --  93 93   RBC 3.96*  --  3.68* 3.79*   MCH 31.6*  --  31.5* 31.4*   MCHC 33.3  --  33.9 33.8   RDW 12.5  --  12.6 12.5   PLT 86*  --  92* 104*   MPV 10.4  --  10.2 10.1   GRAN 74.9*  4.3  --  76.4*  3.5 66.3  2.9   LYMPH 14.0*  0.8*  --  12.9*  0.6* 20.1  0.9*   MONO 9.6  0.6  --  8.8  0.4 10.0  0.4   EOSINOPHIL 1.0  --  1.5 3.2   BASOPHIL 0.2  --  0.2 0.2     Recent Labs   Lab 07/25/22  1054 07/26/22  1253 07/27/22  0454   * 137 140   K 5.4* 4.4 4.8    106 108   CO2 23 19* 22*   ANIONGAP 7* 12 10   BUN 28* 29* 27*   CREATININE 1.23 1.4 1.3   * 306* 145*   CALCIUM 9.0 9.3 8.9   PROT 7.2 7.0 6.0   ALBUMIN 4.1 3.2* 3.0*   ALKPHOS 87 103 82   BILITOT 2.1* 1.3* 1.3*   ALT 19 15 14   AST 29 20 17   ESTGFRAFRICA 51.0* 44* 48*   EGFRNONAA 44.2* 38* 41*   MG  --   --  2.3   PHOS  --   --  3.5     Recent Labs   Lab 07/25/22  1024   COLORU [place]*   APPEARANCEUA Hazy*   PHUR 6.0   SPECGRAV 1.020   PROTEINUA Trace*   GLUCUA Negative   KETONESU Trace*   BILIRUBINUA 1+*   OCCULTUA Trace*   UROBILINOGEN >=8.0*   NITRITE Negative   LEUKOCYTESUR  Negative     No results for input(s): TROPONINI, CPK, CPKMB in the last 168 hours.  No results for input(s): PT, INR, APTT in the last 168 hours.  Recent Labs   Lab 07/26/22  1250   TSH 2.563     MRI Abdomen W WO Contrast    Result Date: 7/26/2022  EXAMINATION: MRI ABDOMEN W WO CONTRAST CLINICAL HISTORY: Splenomegaly, not elsewhere classified.  Left lower quadrant pain.  Abnormal abdominal CT, splenic lesion. TECHNIQUE: MR abdomen with and without contrast performed with multiplanar T1, in and out of phase, and T2 weighted sequences including dynamic post-contrast imaging.  10 cc Gadavist. COMPARISON: Abdominal CT with contrast 07/25/2022.  Abdominal ultrasound 03/21/2022. FINDINGS: As seen on the  CT scan yesterday, there is a nonenhancing geographic area of signal alteration involving the lower aspect of the spleen, 6 6.9 x 4.2 x 6.3 cm, with isointense T1 and predominantly homogeneous hyperintense T2 signal with peripheral intermediate signal.  Again, there is a vessel seen coursing through the inferior medial margin of the signal alteration.  Again, there is trace aparna-splenic fluid.  Again, there is mild splenomegaly , 12.7 x 6 x 13.1 cm.  Looking back at the prior abdominal ultrasound, March 2022, the spleen demonstrated normal homogeneous echogenicity without mass.  These imaging features represent splenic infarction sequela.  The morphology of the signal alteration, lack of peripheral rim enhancement, and the vessel coursing through the signal alteration would make a large splenic abscess very unlikely. Additionally, there is concomitant deep venous thrombosis with partially occlusive filling defect involving the distal splenic vein and main portal vein, same pattern as on yesterday's CT exam.  The SMV is patent. Again, nodular liver surface contour characteristic of cirrhosis.  The liver demonstrates homogeneous signal intensity and enhancement without mass. Again, the gallbladder is distended with  a large gallstone, 2.8 cm, and a second 0.8 cm stone.  No evidence of gallbladder inflammation.  The common hepatic duct/common bile duct is dilated, up to 10 mm, with normal tapering of the distal common bile duct.  There is no intrahepatic ductal dilatation.  No evidence of a intraductal filling defect, dedicated MRCP imaging not performed.  No pancreatic duct dilatation. Pancreas, adrenal glands, and kidneys are normal. Normal caliber aorta.  There is no lymphadenopathy.     Mild splenomegaly with superimposed splenic infarction with nonenhancing mildly complex fluid collection anterior inferior aspect of the spleen, 7 x 4 x 6 cm, with minimal aparna-splenic fluid in the setting of deep venous thrombosis with partially occlusive clot distal splenic vein/main portal vein. Cirrhosis.  Negative for liver mass. Cholelithiasis. COMMUNICATION This critical result was discovered/received at 08:45 hours.  The critical information above was relayed directly by me by telephone to emergency department physician, Dr. Fuller, on 07/26/2022 at 08:50 hours. Electronically signed by: Cesar Giles MD Date:    07/26/2022 Time:    09:02    CT Abdomen Pelvis With Contrast    Result Date: 7/25/2022  EXAMINATION: CT ABDOMEN PELVIS WITH CONTRAST CLINICAL HISTORY: LLQ abdominal pain; TECHNIQUE: Standard abdomen and pelvis CT protocol with IV contrast was performed.  100 mL of Omnipaque 350 contrast material was used for this examination.  There was no oral contrast administered. COMPARISON: An ultrasound examination of the abdomen performed on 03/21/2022. FINDINGS: Finding: The size of the heart is within normal limits.  There is an 8 mm noncalcified pulmonary nodule in the left lower lobe.  There are mild dependent atelectatic changes in both lungs.  There is no pneumothorax or pleural effusion. The liver has a nodular surface.  There is a 29 mm stone in the dependent portion of the gallbladder.  There is moderate generalized atrophy of  the pancreas.  There is a 71 mm area of hypodensity in the anterior inferior aspect of the spleen.  There are mild inflammatory changes adjacent to the inferior aspect of the spleen.  The adrenals and kidneys are normal in appearance. The ureters and the urinary bladder are normal in appearance.  The uterus is absent.  The appendix is normal in appearance.  There is a marked amount of diverticulosis throughout the majority of the colon.  There are findings characteristic of a moderate size diverticulum off of the left side of the 2nd portion of the duodenum.  There are findings characteristic of a small diverticulum off of the superior aspect of the 3rd portion of the duodenum.  There is a tiny amount of free fluid within the pelvis.  There is no pneumoperitoneum.  There is a moderate amount of atherosclerosis.     1. There is a 71 mm area of hypodensity in the anterior inferior aspect of the spleen.  There are mild inflammatory changes adjacent to the inferior aspect of the spleen.  Hence common infectious process cannot be excluded.  If additional imaging evaluation is clinically indicated, I recommend consideration of an MRI examination of the abdomen without and with IV contrast. 2. There is a 29 mm stone in the dependent portion of the gallbladder. 3. The liver has a nodular surface.  This is characteristic of hepatic cirrhosis. 4. There is an 8 mm noncalcified pulmonary nodule in the left lower lobe.  I recommend consideration of a follow-up CT examination of the thorax without IV contrast in 4 months. 5. There is a marked amount of diverticulosis throughout the majority of the colon. There are findings characteristic of a moderate size diverticulum off of the left side of the 2nd portion of the duodenum.  There are findings characteristic of a small diverticulum off of the superior aspect of the 3rd portion of the duodenum. All CT scans at this facility use dose modulation, iterative reconstruction, and/or  weight base dosing when appropriate to reduce radiation dose when appropriate to reduce radiation dose to as low as reasonably achievable. Electronically signed by: Jered Vicente MD Date:    07/25/2022 Time:    12:16    US Lower Extremity Veins Bilateral    Result Date: 7/26/2022  EXAMINATION: US LOWER EXTREMITY VEINS BILATERAL CLINICAL HISTORY: afib; TECHNIQUE: Duplex and color flow Doppler and dynamic compression was performed of the bilateral lower extremity veins was performed. COMPARISON: None FINDINGS: Right thigh veins: The common femoral, femoral, popliteal, upper greater saphenous, and deep femoral veins are patent and free of thrombus. The veins are normally compressible and have normal phasic flow and augmentation response. Right calf veins: The visualized calf veins are patent. Left thigh veins: The common femoral, femoral, popliteal, upper greater saphenous, and deep femoral veins are patent and free of thrombus. The veins are normally compressible and have normal phasic flow and augmentation response. Left calf veins: The visualized calf veins are patent. Miscellaneous: There is bilateral thigh edema.     No evidence of deep venous thrombosis in either lower extremity. Electronically signed by: Clifford Masterson Date:    07/26/2022 Time:    19:54    Echo Saline Bubble? Yes    Result Date: 7/26/2022  · Limited visualization for bubble study. There is no evidence of intracardiac shunting. · Concentric remodeling and low normal systolic function. · The estimated ejection fraction is 50%. · Normal left ventricular diastolic function. · Normal right ventricular size with normal right ventricular systolic function. · Mild to moderate pulmonic regurgitation. · Normal central venous pressure (3 mmHg). · The estimated PA systolic pressure is 20 mmHg.      Microbiology Results (last 7 days)     ** No results found for the last 168 hours. **            Pending Diagnostic Studies:     None          Medications:  Reconciled Home Medications:      Medication List      CONTINUE taking these medications    ascorbic acid (vitamin C) 100 MG tablet  Commonly known as: VITAMIN C  Take 100 mg by mouth once daily.     calcium carbonate 500 mg calcium (1,250 mg) tablet  Commonly known as: OS-ROMY  Take 1 tablet by mouth once daily.     cholecalciferol (vitamin D3) 10 mcg (400 unit) Chew  Take 1 tablet by mouth once daily.     cinnamon bark 500 mg capsule  Take 1,000 mg by mouth once daily.     citalopram 40 MG tablet  Commonly known as: CeleXA  Take 40 mg by mouth once daily.     diltiaZEM 120 MG Cp24  Commonly known as: CARDIZEM CD  Take 120 mg by mouth once daily.     ferrous sulfate 324 mg (65 mg iron) Tbec  Take 324 mg by mouth once daily.     fluorometholone 0.1% 0.1 % Drps  Commonly known as: FML  Place 1 drop into the left eye once daily.     glipiZIDE 5 MG tablet  Commonly known as: GLUCOTROL  Take 5 mg by mouth once daily at 6am.     ibuprofen 200 MG tablet  Commonly known as: ADVIL,MOTRIN  Take 200 mg by mouth every 6 (six) hours as needed for Pain.     LANTUS SOLOSTAR U-100 INSULIN glargine 100 units/mL SubQ pen  Generic drug: insulin  Inject 20 Units into the skin every evening.     metoprolol succinate 25 MG 24 hr tablet  Commonly known as: TOPROL-XL  Take 25 mg by mouth daily as needed.     multivitamin capsule  Take 1 capsule by mouth once daily.     omega-3 fatty acids 1,000 mg Cap  Take 1 g by mouth once daily.     pantoprazole 40 MG tablet  Commonly known as: PROTONIX  Take 40 mg by mouth once daily.     rosuvastatin 5 MG tablet  Commonly known as: CRESTOR  5 mg once daily.     spironolactone 50 MG tablet  Commonly known as: ALDACTONE  Take 50 mg by mouth once daily.        STOP taking these medications    aspirin 81 MG EC tablet  Commonly known as: ECOTRIN            Indwelling Lines/Drains at time of discharge:   Lines/Drains/Airways     None                 Time spent on the discharge of patient: 35  minutes         Lucian Reyes MD  Department of Hospital Medicine  [facility] Surg

## 2022-07-27 NOTE — ASSESSMENT & PLAN NOTE
Normotensive on admit 135/78  Home meds Diltiazem, Spironolactone 50mg qd, and metoprolol      Plan:  Cnt home meds  Cnt to monitor for HTN  Hydralazine PRN for SPB >180

## 2022-07-27 NOTE — ASSESSMENT & PLAN NOTE
Pt w/ sharp intermittent LUQ pain since 7/24   Afib dx 1 mo prior and not on anti-coag 2/2 cirrhosis and varices from Hep C   Presented to ED 7/25 for this issue and CT Abd/ Pelvis at that time which showed 71 mm hypodensity in the anterior inferior aspect of spleen  MRI performed outpt 7/26 w/ splenic infarct and partially occlusive DVT in distal splenic vein/main portal vein  Gen Surg consulted in ED for possible splenectomy  Anticoag started w/ lovenox after risk/benefits discussion   ECHO w/ bubble w/ no signs of shunting and EF 50%  DVT US w/o signs of thombosis    Plan  F/u Gen Surg recs  Cnt anticoag and monitor for signs of bleeding

## 2022-07-27 NOTE — ASSESSMENT & PLAN NOTE
"Afib dx 6wks prior   Pt on Metoprolol 25mg qd (taken prn "for HR >80") as well as diltiazem 120mg ER qd  EKG from ED 7/25 w/ Afib RVR   EKG from admit NSR HR 96    Plan:  Cnt home metoprolol and diltiazem         "

## 2022-07-27 NOTE — PLAN OF CARE
VN reviewed discharge instructions with pt. Using teach back method.  AVS printed and handed to pt by bedside nurse.  Reviewed follow-up appointments, medications, diet, and importance of medication compliance.  Reviewed home care instructions, treatment plan, self-management, and when to seek medical attention.  Allowed time for questions.  All questions answered.  Patient verbalized complete understanding of discharge instructions and voices no concerns.     Discharge instructions complete.  .  Transport/wheelchair requested.  Bedside nurse notified.
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
## 2023-02-10 DIAGNOSIS — R55 SYNCOPE AND COLLAPSE: Primary | ICD-10-CM

## 2023-02-15 ENCOUNTER — HOSPITAL ENCOUNTER (OUTPATIENT)
Dept: RADIOLOGY | Facility: HOSPITAL | Age: 73
Discharge: HOME | End: 2023-02-15
Attending: FAMILY MEDICINE
Payer: MEDICARE

## 2023-02-15 DIAGNOSIS — R55 SYNCOPE AND COLLAPSE: ICD-10-CM

## 2023-02-16 ENCOUNTER — HOSPITAL ENCOUNTER (OUTPATIENT)
Dept: RADIOLOGY | Facility: HOSPITAL | Age: 73
Discharge: HOME | End: 2023-02-16
Attending: FAMILY MEDICINE
Payer: MEDICARE

## 2023-02-16 PROCEDURE — 70551 MRI BRAIN WITHOUT CONTRAST: ICD-10-PCS | Mod: 26,,, | Performed by: RADIOLOGY

## 2023-02-16 PROCEDURE — 70551 MRI BRAIN STEM W/O DYE: CPT | Mod: 26,,, | Performed by: RADIOLOGY

## 2023-02-16 PROCEDURE — 70551 MRI BRAIN STEM W/O DYE: CPT | Mod: TC,PO

## 2023-06-01 DIAGNOSIS — Z12.31 SCREENING MAMMOGRAM, ENCOUNTER FOR: Primary | ICD-10-CM

## 2023-07-05 ENCOUNTER — HOSPITAL ENCOUNTER (OUTPATIENT)
Dept: RADIOLOGY | Facility: HOSPITAL | Age: 73
Discharge: HOME | End: 2023-07-05
Attending: FAMILY MEDICINE
Payer: MEDICARE

## 2023-07-05 DIAGNOSIS — Z12.31 SCREENING MAMMOGRAM, ENCOUNTER FOR: ICD-10-CM

## 2023-07-05 PROCEDURE — 77067 MAMMO DIGITAL SCREENING BILAT WITH TOMO: ICD-10-PCS | Mod: 26,,, | Performed by: RADIOLOGY

## 2023-07-05 PROCEDURE — 77063 MAMMO DIGITAL SCREENING BILAT WITH TOMO: ICD-10-PCS | Mod: 26,,, | Performed by: RADIOLOGY

## 2023-07-05 PROCEDURE — 77067 SCR MAMMO BI INCL CAD: CPT | Mod: 26,,, | Performed by: RADIOLOGY

## 2023-07-05 PROCEDURE — 77067 SCR MAMMO BI INCL CAD: CPT | Mod: TC,PO

## 2023-07-05 PROCEDURE — 77063 BREAST TOMOSYNTHESIS BI: CPT | Mod: 26,,, | Performed by: RADIOLOGY

## 2023-10-23 ENCOUNTER — HOSPITAL ENCOUNTER (EMERGENCY)
Facility: HOSPITAL | Age: 73
Discharge: HOME | End: 2023-10-23
Attending: EMERGENCY MEDICINE
Payer: MEDICARE

## 2023-10-23 VITALS
SYSTOLIC BLOOD PRESSURE: 134 MMHG | TEMPERATURE: 98 F | RESPIRATION RATE: 18 BRPM | OXYGEN SATURATION: 98 % | WEIGHT: 200 LBS | HEIGHT: 64 IN | DIASTOLIC BLOOD PRESSURE: 86 MMHG | HEART RATE: 66 BPM | BODY MASS INDEX: 34.15 KG/M2

## 2023-10-23 DIAGNOSIS — R07.9 CHEST PAIN: ICD-10-CM

## 2023-10-23 DIAGNOSIS — U07.1 COVID-19: Primary | ICD-10-CM

## 2023-10-23 DIAGNOSIS — R05.9 COUGH, UNSPECIFIED TYPE: ICD-10-CM

## 2023-10-23 LAB
ALBUMIN SERPL BCP-MCNC: 3.6 G/DL (ref 3.5–5.2)
ALP SERPL-CCNC: 122 U/L (ref 38–126)
ALT SERPL W/O P-5'-P-CCNC: 33 U/L (ref 10–44)
ANION GAP SERPL CALC-SCNC: 10 MMOL/L (ref 8–16)
AST SERPL-CCNC: 46 U/L (ref 15–46)
BASOPHILS # BLD AUTO: ABNORMAL K/UL (ref 0–0.2)
BASOPHILS # BLD AUTO: ABNORMAL K/UL (ref 0–0.2)
BASOPHILS NFR BLD: 0 % (ref 0–1.9)
BASOPHILS NFR BLD: 0 % (ref 0–1.9)
BILIRUB SERPL-MCNC: 0.8 MG/DL (ref 0.1–1)
CALCIUM SERPL-MCNC: 9.3 MG/DL (ref 8.7–10.5)
CHLORIDE SERPL-SCNC: 105 MMOL/L (ref 95–110)
CO2 SERPL-SCNC: 23 MMOL/L (ref 23–29)
CREAT SERPL-MCNC: 0.94 MG/DL (ref 0.5–1.4)
DIFFERENTIAL METHOD: ABNORMAL
DIFFERENTIAL METHOD: ABNORMAL
EOSINOPHIL # BLD AUTO: ABNORMAL K/UL (ref 0–0.5)
EOSINOPHIL # BLD AUTO: ABNORMAL K/UL (ref 0–0.5)
EOSINOPHIL NFR BLD: 1 % (ref 0–8)
EOSINOPHIL NFR BLD: 1 % (ref 0–8)
ERYTHROCYTE [DISTWIDTH] IN BLOOD BY AUTOMATED COUNT: 12.8 % (ref 11.5–14.5)
ERYTHROCYTE [DISTWIDTH] IN BLOOD BY AUTOMATED COUNT: 12.8 % (ref 11.5–14.5)
EST. GFR  (NO RACE VARIABLE): >60 ML/MIN/1.73 M^2
GLUCOSE SERPL-MCNC: 205 MG/DL (ref 70–110)
HCT VFR BLD AUTO: 36.7 % (ref 37–48.5)
HCT VFR BLD AUTO: 36.9 % (ref 37–48.5)
HGB BLD-MCNC: 12.1 G/DL (ref 12–16)
HGB BLD-MCNC: 12.2 G/DL (ref 12–16)
IMM GRANULOCYTES # BLD AUTO: ABNORMAL K/UL (ref 0–0.04)
IMM GRANULOCYTES # BLD AUTO: ABNORMAL K/UL (ref 0–0.04)
IMM GRANULOCYTES NFR BLD AUTO: ABNORMAL % (ref 0–0.5)
IMM GRANULOCYTES NFR BLD AUTO: ABNORMAL % (ref 0–0.5)
LYMPHOCYTES # BLD AUTO: ABNORMAL K/UL (ref 1–4.8)
LYMPHOCYTES # BLD AUTO: ABNORMAL K/UL (ref 1–4.8)
LYMPHOCYTES NFR BLD: 17 % (ref 18–48)
LYMPHOCYTES NFR BLD: 19 % (ref 18–48)
MCH RBC QN AUTO: 31.2 PG (ref 27–31)
MCH RBC QN AUTO: 31.2 PG (ref 27–31)
MCHC RBC AUTO-ENTMCNC: 32.8 G/DL (ref 32–36)
MCHC RBC AUTO-ENTMCNC: 33.2 G/DL (ref 32–36)
MCV RBC AUTO: 94 FL (ref 82–98)
MCV RBC AUTO: 95 FL (ref 82–98)
MONOCYTES # BLD AUTO: ABNORMAL K/UL (ref 0.3–1)
MONOCYTES # BLD AUTO: ABNORMAL K/UL (ref 0.3–1)
MONOCYTES NFR BLD: 7 % (ref 4–15)
MONOCYTES NFR BLD: 9 % (ref 4–15)
NEUTROPHILS NFR BLD: 65 % (ref 38–73)
NEUTROPHILS NFR BLD: 73 % (ref 38–73)
NEUTS BAND NFR BLD MANUAL: 2 %
NEUTS BAND NFR BLD MANUAL: 6 %
NRBC BLD-RTO: 0 /100 WBC
NRBC BLD-RTO: 0 /100 WBC
PLATELET # BLD AUTO: 79 K/UL (ref 150–450)
PLATELET # BLD AUTO: 82 K/UL (ref 150–450)
PMV BLD AUTO: 9.7 FL (ref 9.2–12.9)
PMV BLD AUTO: 9.8 FL (ref 9.2–12.9)
POTASSIUM SERPL-SCNC: 4.5 MMOL/L (ref 3.5–5.1)
PROT SERPL-MCNC: 6.6 G/DL (ref 6–8.4)
RBC # BLD AUTO: 3.88 M/UL (ref 4–5.4)
RBC # BLD AUTO: 3.91 M/UL (ref 4–5.4)
SODIUM SERPL-SCNC: 138 MMOL/L (ref 136–145)
TROPONIN I SERPL-MCNC: <0.012 NG/ML (ref 0.01–0.03)
UUN UR-MCNC: 16 MG/DL (ref 7–17)
WBC # BLD AUTO: 1.85 K/UL (ref 3.9–12.7)
WBC # BLD AUTO: 1.88 K/UL (ref 3.9–12.7)

## 2023-10-23 PROCEDURE — 93005 ELECTROCARDIOGRAM TRACING: CPT | Mod: ER

## 2023-10-23 PROCEDURE — 85025 COMPLETE CBC W/AUTO DIFF WBC: CPT | Mod: ER | Performed by: PHYSICIAN ASSISTANT

## 2023-10-23 PROCEDURE — 84484 ASSAY OF TROPONIN QUANT: CPT | Mod: ER | Performed by: NURSE PRACTITIONER

## 2023-10-23 PROCEDURE — 99285 EMERGENCY DEPT VISIT HI MDM: CPT | Mod: 25,ER

## 2023-10-23 PROCEDURE — 93010 ELECTROCARDIOGRAM REPORT: CPT | Mod: ,,, | Performed by: INTERNAL MEDICINE

## 2023-10-23 PROCEDURE — 99900035 HC TECH TIME PER 15 MIN (STAT): Mod: ER

## 2023-10-23 PROCEDURE — 80053 COMPREHEN METABOLIC PANEL: CPT | Mod: ER | Performed by: NURSE PRACTITIONER

## 2023-10-23 PROCEDURE — 94760 N-INVAS EAR/PLS OXIMETRY 1: CPT | Mod: ER

## 2023-10-23 PROCEDURE — 93010 EKG 12-LEAD: ICD-10-PCS | Mod: ,,, | Performed by: INTERNAL MEDICINE

## 2023-10-23 NOTE — FIRST PROVIDER EVALUATION
Emergency Department TeleTriage Encounter Note      CHIEF COMPLAINT    Chief Complaint   Patient presents with    Cough     Cough, congestion, diarrhea, + covid last week, no feeling any better        VITAL SIGNS   Initial Vitals [10/23/23 1506]   BP Pulse Resp Temp SpO2   (!) 126/98 61 20 98.9 °F (37.2 °C) 99 %      MAP       --            ALLERGIES    Review of patient's allergies indicates:   Allergen Reactions    Codeine Swelling    Penicillins Rash       PROVIDER TRIAGE NOTE  Verbal consent for the teletriage evaluation was given by the patient at the start of the evaluation.  All efforts will be made to maintain patient's privacy during the evaluation.      This is a teletriage evaluation of a 73 y.o. female presenting to the ED with c/o tested positive for COVID on 10/18 and reports symptoms are worsening.  Reports cough, congestion, and chest pressure that started today. Limited physical exam via telehealth: The patient is\ awake, alert, answering questions appropriately and is not in respiratory distress.  As the Teletriage provider, I performed an initial assessment and ordered appropriate labs and imaging studies, if any, to facilitate the patient's care once placed in the ED. Once a room is available, care and a full evaluation will be completed by an alternate ED provider.  Any additional orders and the final disposition will be determined by that provider.  All imaging and labs will not be followed-up by the Teletriage Team, including myself.          ORDERS  Labs Reviewed - No data to display    ED Orders (720h ago, onward)      Start Ordered     Status Ordering Provider    10/23/23 1812 10/23/23 1511  Troponin I #2  Once Timed         Ordered CHRISTOPHER BRADLEY    10/23/23 1512 10/23/23 1511  Vital signs  Every 15 min         Ordered CHRISTOPHER BRADLEY    10/23/23 1512 10/23/23 1511  Cardiac Monitoring - Adult  Continuous        Comments: Notify Physician If:    Ordered CHRISTOPHER BRADLEY    10/23/23 1512 10/23/23  1511  Pulse Oximetry Continuous  Continuous         Ordered CHRISTOPHER BRADLEY    10/23/23 1512 10/23/23 1511  Diet NPO  Diet effective now         Ordered CHRISTOPHER BRADLEY    10/23/23 1512 10/23/23 1511  Saline lock IV  Once         Ordered CHRISTOPHER BRADLEY    10/23/23 1512 10/23/23 1511  EKG 12-lead  Once        Comments: Do not perform if previously done during this visit/ triage    Ordered CHRISTOPHER BRADLEY    10/23/23 1512 10/23/23 1511  CBC auto differential  STAT         Ordered CHRISTOPHER BRADLEY    10/23/23 1512 10/23/23 1511  Comprehensive metabolic panel  STAT         Ordered CHRISTOPHER BRADLEY    10/23/23 1512 10/23/23 1511  Troponin I #1  STAT         Ordered CHRISTOPHER BRADLEY    10/23/23 1512 10/23/23 1511  X-Ray Chest PA And Lateral  1 time imaging         Ordered CHRISTOPHER BRADLEY              Virtual Visit Note: The provider triage portion of this emergency department evaluation and documentation was performed via [employer], a HIPAA-compliant telemedicine application, in concert with a tele-presenter in the room. A face to face patient evaluation with one of my colleagues will occur once the patient is placed in an emergency department room.      DISCLAIMER: This note was prepared with [employer] voice recognition transcription software. Garbled syntax, mangled pronouns, and other bizarre constructions may be attributed to that software system.

## 2023-10-23 NOTE — ED PROVIDER NOTES
Encounter Date: 10/23/2023       History     Chief Complaint   Patient presents with    Cough     Cough, congestion, diarrhea, + covid last week, no feeling any better      This is a 73-year-old white female with significant past medical history of hepatitis-C (treated) and hypertension that was diagnosed with COVID 5 days ago and presents to the ED with continued complaint of runny nose, congestion, and cough.  She states she is no worse than she was when she was diagnosed but was concerned because her symptoms have not resolved yet.  She has been taking prescribed cough medicine which has helped.  She has been taking no other medication.  She denies any fever, chest pain, shortness on breath, abdominal pain, nausea, vomiting, or diarrhea.      Review of patient's allergies indicates:   Allergen Reactions    Codeine Swelling    Penicillins Rash     No past medical history on file.  Past Surgical History:   Procedure Laterality Date    HYSTERECTOMY      OOPHORECTOMY       Family History   Problem Relation Age of Onset    Breast cancer Sister         Review of Systems   Constitutional:  Negative for fever.   HENT:  Positive for congestion and rhinorrhea. Negative for ear pain and sore throat.    Respiratory:  Positive for cough. Negative for shortness of breath.    Cardiovascular:  Negative for chest pain and palpitations.   Gastrointestinal:  Negative for diarrhea, nausea and vomiting.       Physical Exam     Initial Vitals [10/23/23 1506]   BP Pulse Resp Temp SpO2   (!) 126/98 61 20 98.9 °F (37.2 °C) 99 %      MAP       --         Physical Exam    Constitutional: She appears well-developed and well-nourished.   HENT:   Head: Normocephalic and atraumatic.   Right Ear: Hearing, tympanic membrane, external ear and ear canal normal.   Left Ear: Hearing, tympanic membrane, external ear and ear canal normal.   Nose: Nose normal.   Mouth/Throat: Uvula is midline, oropharynx is clear and moist and mucous membranes are  normal.   Cardiovascular:  Normal rate, regular rhythm and normal heart sounds.           Pulmonary/Chest: Breath sounds normal. She has no wheezes.     Neurological: She is alert and oriented to person, place, and time.   Psychiatric: She has a normal mood and affect. Thought content normal.         ED Course   Procedures  Labs Reviewed   CBC W/ AUTO DIFFERENTIAL - Abnormal; Notable for the following components:       Result Value    WBC 1.88 (*)     RBC 3.88 (*)     Hematocrit 36.9 (*)     MCH 31.2 (*)     Platelets 82 (*)     All other components within normal limits    Narrative:     WBC   critical result(s) called and verbal readback obtained from   Lein Cruz RN by ESTEPHANIA 10/23/2023 16:48   COMPREHENSIVE METABOLIC PANEL - Abnormal; Notable for the following components:    Glucose 205 (*)     All other components within normal limits   CBC W/ AUTO DIFFERENTIAL - Abnormal; Notable for the following components:    WBC 1.85 (*)     RBC 3.91 (*)     Hematocrit 36.7 (*)     MCH 31.2 (*)     Platelets 79 (*)     All other components within normal limits    Narrative:     WBC   critical result(s) called and verbal readback obtained from   Lien Cruz RN by ESTEPHANIA 10/23/2023 17:44   TROPONIN I          Imaging Results              X-Ray Chest PA And Lateral (Final result)  Result time 10/23/23 15:29:35      Final result by Juan Martin MD (10/23/23 15:29:35)                   Impression:      No acute process seen.      Electronically signed by: Juan Martin MD  Date:    10/23/2023  Time:    15:29               Narrative:    EXAMINATION:  XR CHEST PA AND LATERAL    CLINICAL HISTORY:  Chest Pain;    COMPARISON:  None    FINDINGS:  Cardiac silhouette is mildly enlarged.  Clips seen overlying the left atrium..   The lungs demonstrate no evidence of active disease.  No evidence of pleural effusion or pneumothorax.  Bones appear intact.  Moderate degenerative changes and moderate atherosclerotic disease.                                     X-Rays:   Independently Interpreted Readings:   Other Readings:  Chest x-ray was independently interpreted by me and shows no acute cardiopulmonary process.    Medications - No data to display  Medical Decision Making  This is a 73-year-old white female diagnosed with COVID-19 5 days ago that presents the ED with concern over continued rhinorrhea, congestion, and cough.  On arrival the patient is afebrile and nontoxic-appearing with stable vital signs.  Differential diagnoses includes but is not limited to:  COVID-19, pneumonia, bronchitis, reactive airway disease.  Please see physical exam for further details.  CBC shows a white blood cell count of 1.88 and platelets of 82.  We felt that this could possibly be an air so a CBC was repeated but these values did not change.  Glucose of 205.  Troponin within normal limits.  Based on the patient's neutropenia and thrombocytopenia, Dr. Martinez's [facility] family Medicine team was called as a consult for this patient and they felt that the patient was safe for discharge and outpatient follow up with these findings.  They instructed the patient to follow up with her PCP this week for further evaluation.  Chest x-ray shows no acute process.  The patient has been instructed to continue taking her prescribed cough medicine, rest, stay hydrated, and take Tylenol as needed.  She is to return to the ED for worsening.  She verbalized understanding and was agreeable to the treatment plan.    Amount and/or Complexity of Data Reviewed  Labs: ordered.                               Clinical Impression:   Final diagnoses:  [R07.9] Chest pain  [U07.1] COVID-19 (Primary)  [R05.9] Cough, unspecified type        ED Disposition Condition    Discharge Stable          ED Prescriptions    None       Follow-up Information       Follow up With Specialties Details Why Contact Info    Nahum Hull MD Family Medicine Schedule an appointment as soon as possible for a visit in 1  day  [address] [facility]  [address]  [facility] [address]  [phone]      [facility] - Emergency Dept Emergency Medicine  If symptoms worsen [address] [facility] [address] [phone]             Anurag Montilla PA-C  10/23/23 7527

## 2023-11-29 DIAGNOSIS — R59.0 LOCALIZED ENLARGED LYMPH NODES: Primary | ICD-10-CM

## 2023-12-05 ENCOUNTER — HOSPITAL ENCOUNTER (OUTPATIENT)
Dept: RADIOLOGY | Facility: HOSPITAL | Age: 73
Discharge: HOME | End: 2023-12-05
Attending: FAMILY MEDICINE
Payer: MEDICARE

## 2023-12-05 DIAGNOSIS — R59.0 LOCALIZED ENLARGED LYMPH NODES: ICD-10-CM

## 2023-12-05 PROCEDURE — 70491 CT SOFT TISSUE NECK W/DYE: CPT | Mod: TC,PN

## 2023-12-05 PROCEDURE — 70491 CT SOFT TISSUE NECK W/DYE: CPT | Mod: 26,,, | Performed by: RADIOLOGY

## 2023-12-05 PROCEDURE — 70491 CT SOFT TISSUE NECK WITH CONTRAST: ICD-10-PCS | Mod: 26,,, | Performed by: RADIOLOGY

## 2023-12-05 PROCEDURE — 25500020 PHARM REV CODE 255: Mod: PN | Performed by: FAMILY MEDICINE

## 2023-12-05 RX ADMIN — IOHEXOL 75 ML: 350 INJECTION, SOLUTION INTRAVENOUS at 01:12

## 2024-03-01 DIAGNOSIS — L04.1 ACUTE LYMPHADENITIS OF TRUNK: ICD-10-CM

## 2024-03-01 DIAGNOSIS — R59.9 SWELLING OF LYMPH NODE: Primary | ICD-10-CM

## 2024-03-11 ENCOUNTER — HOSPITAL ENCOUNTER (OUTPATIENT)
Dept: RADIOLOGY | Facility: HOSPITAL | Age: 74
Discharge: HOME | End: 2024-03-11
Attending: FAMILY MEDICINE
Payer: MEDICARE

## 2024-03-11 DIAGNOSIS — L04.1 ACUTE LYMPHADENITIS OF TRUNK: ICD-10-CM

## 2024-03-11 PROCEDURE — 71250 CT THORAX DX C-: CPT | Mod: 26,,, | Performed by: RADIOLOGY

## 2024-03-11 PROCEDURE — 71250 CT THORAX DX C-: CPT | Mod: TC,PN

## 2024-03-22 DIAGNOSIS — I50.9 HEART FAILURE, UNSPECIFIED: Primary | ICD-10-CM

## 2024-03-27 ENCOUNTER — LAB VISIT (OUTPATIENT)
Dept: LAB | Facility: HOSPITAL | Age: 74
End: 2024-03-27
Attending: INTERNAL MEDICINE
Payer: MEDICARE

## 2024-03-27 DIAGNOSIS — N18.31 CHRONIC KIDNEY DISEASE (CKD) STAGE G3A/A1, MODERATELY DECREASED GLOMERULAR FILTRATION RATE (GFR) BETWEEN 45-59 ML/MIN/1.73 SQUARE METER AND ALBUMINURIA CREATININE RATIO LESS THAN 30 MG/G: Primary | ICD-10-CM

## 2024-03-27 DIAGNOSIS — I10 ESSENTIAL HYPERTENSION, MALIGNANT: ICD-10-CM

## 2024-03-27 LAB
ALBUMIN SERPL BCP-MCNC: 3.1 G/DL (ref 3.5–5.2)
ANION GAP SERPL CALC-SCNC: 6 MMOL/L (ref 8–16)
BASOPHILS # BLD AUTO: 0.03 K/UL (ref 0–0.2)
BASOPHILS NFR BLD: 0.4 % (ref 0–1.9)
CALCIUM SERPL-MCNC: 9 MG/DL (ref 8.7–10.5)
CHLORIDE SERPL-SCNC: 108 MMOL/L (ref 95–110)
CO2 SERPL-SCNC: 28 MMOL/L (ref 23–29)
CREAT SERPL-MCNC: 0.66 MG/DL (ref 0.5–1.4)
DIFFERENTIAL METHOD BLD: ABNORMAL
EOSINOPHIL # BLD AUTO: 0.2 K/UL (ref 0–0.5)
EOSINOPHIL NFR BLD: 2.4 % (ref 0–8)
ERYTHROCYTE [DISTWIDTH] IN BLOOD BY AUTOMATED COUNT: 14 % (ref 11.5–14.5)
EST. GFR  (NO RACE VARIABLE): >60 ML/MIN/1.73 M^2
GLUCOSE SERPL-MCNC: 88 MG/DL (ref 70–110)
HCT VFR BLD AUTO: 38.3 % (ref 37–48.5)
HGB BLD-MCNC: 12.6 G/DL (ref 12–16)
IMM GRANULOCYTES # BLD AUTO: 0.02 K/UL (ref 0–0.04)
IMM GRANULOCYTES NFR BLD AUTO: 0.3 % (ref 0–0.5)
LYMPHOCYTES # BLD AUTO: 1.1 K/UL (ref 1–4.8)
LYMPHOCYTES NFR BLD: 16.1 % (ref 18–48)
MCH RBC QN AUTO: 30.8 PG (ref 27–31)
MCHC RBC AUTO-ENTMCNC: 32.9 G/DL (ref 32–36)
MCV RBC AUTO: 94 FL (ref 82–98)
MONOCYTES # BLD AUTO: 0.6 K/UL (ref 0.3–1)
MONOCYTES NFR BLD: 8.9 % (ref 4–15)
NEUTROPHILS # BLD AUTO: 4.8 K/UL (ref 1.8–7.7)
NEUTROPHILS NFR BLD: 71.9 % (ref 38–73)
NRBC BLD-RTO: 0 /100 WBC
PHOSPHATE SERPL-MCNC: 2.7 MG/DL (ref 2.7–4.5)
PLATELET # BLD AUTO: 168 K/UL (ref 150–450)
PMV BLD AUTO: 9.3 FL (ref 9.2–12.9)
POTASSIUM SERPL-SCNC: 4.3 MMOL/L (ref 3.5–5.1)
PTH-INTACT SERPL-MCNC: 35.7 PG/ML (ref 9–77)
RBC # BLD AUTO: 4.09 M/UL (ref 4–5.4)
SODIUM SERPL-SCNC: 142 MMOL/L (ref 136–145)
UUN UR-MCNC: 10 MG/DL (ref 7–17)
WBC # BLD AUTO: 6.72 K/UL (ref 3.9–12.7)

## 2024-03-27 PROCEDURE — 36415 COLL VENOUS BLD VENIPUNCTURE: CPT | Mod: PN | Performed by: INTERNAL MEDICINE

## 2024-03-27 PROCEDURE — 80069 RENAL FUNCTION PANEL: CPT | Mod: PN | Performed by: INTERNAL MEDICINE

## 2024-03-27 PROCEDURE — 82306 VITAMIN D 25 HYDROXY: CPT | Mod: PN | Performed by: INTERNAL MEDICINE

## 2024-03-27 PROCEDURE — 83970 ASSAY OF PARATHORMONE: CPT | Mod: PN | Performed by: INTERNAL MEDICINE

## 2024-03-27 PROCEDURE — 85025 COMPLETE CBC W/AUTO DIFF WBC: CPT | Mod: PN | Performed by: INTERNAL MEDICINE

## 2024-03-28 LAB — 25(OH)D3+25(OH)D2 SERPL-MCNC: 46 NG/ML (ref 30–96)

## 2024-07-17 DIAGNOSIS — Z12.31 SCREENING MAMMOGRAM, ENCOUNTER FOR: Primary | ICD-10-CM

## 2024-08-23 ENCOUNTER — HOSPITAL ENCOUNTER (OUTPATIENT)
Dept: RADIOLOGY | Facility: HOSPITAL | Age: 74
Discharge: HOME | End: 2024-08-23
Attending: FAMILY MEDICINE
Payer: MEDICARE

## 2024-08-23 DIAGNOSIS — Z12.31 SCREENING MAMMOGRAM, ENCOUNTER FOR: ICD-10-CM

## 2024-08-23 PROCEDURE — 77067 SCR MAMMO BI INCL CAD: CPT | Mod: 26,,, | Performed by: RADIOLOGY

## 2024-08-23 PROCEDURE — 77063 BREAST TOMOSYNTHESIS BI: CPT | Mod: 26,,, | Performed by: RADIOLOGY

## 2024-08-23 PROCEDURE — 77063 BREAST TOMOSYNTHESIS BI: CPT | Mod: TC,PN

## 2025-05-26 ENCOUNTER — HOSPITAL ENCOUNTER (EMERGENCY)
Facility: HOSPITAL | Age: 75
Discharge: HOME | End: 2025-05-26
Attending: EMERGENCY MEDICINE
Payer: MEDICARE

## 2025-05-26 VITALS
HEIGHT: 64 IN | OXYGEN SATURATION: 98 % | HEART RATE: 70 BPM | RESPIRATION RATE: 14 BRPM | TEMPERATURE: 98 F | SYSTOLIC BLOOD PRESSURE: 136 MMHG | BODY MASS INDEX: 29.19 KG/M2 | WEIGHT: 171 LBS | DIASTOLIC BLOOD PRESSURE: 84 MMHG

## 2025-05-26 DIAGNOSIS — S00.83XA TRAUMATIC HEMATOMA OF FOREHEAD, INITIAL ENCOUNTER: ICD-10-CM

## 2025-05-26 DIAGNOSIS — S09.90XA ACUTE HEAD INJURY, INITIAL ENCOUNTER: Primary | ICD-10-CM

## 2025-05-26 DIAGNOSIS — S01.81XA FOREHEAD LACERATION, INITIAL ENCOUNTER: ICD-10-CM

## 2025-05-26 PROCEDURE — 99284 EMERGENCY DEPT VISIT MOD MDM: CPT | Mod: 25,ER

## 2025-05-26 PROCEDURE — 12013 RPR F/E/E/N/L/M 2.6-5.0 CM: CPT | Mod: ER

## 2025-05-26 PROCEDURE — 63600175 PHARM REV CODE 636 W HCPCS: Mod: ER | Performed by: EMERGENCY MEDICINE

## 2025-05-26 PROCEDURE — 25000003 PHARM REV CODE 250: Mod: ER | Performed by: EMERGENCY MEDICINE

## 2025-05-26 RX ORDER — BACITRACIN 500 [USP'U]/G
OINTMENT TOPICAL
Status: COMPLETED | OUTPATIENT
Start: 2025-05-26 | End: 2025-05-26

## 2025-05-26 RX ORDER — LIDOCAINE HYDROCHLORIDE 10 MG/ML
5 INJECTION, SOLUTION EPIDURAL; INFILTRATION; INTRACAUDAL; PERINEURAL
Status: COMPLETED | OUTPATIENT
Start: 2025-05-26 | End: 2025-05-26

## 2025-05-26 RX ORDER — TRAMADOL HYDROCHLORIDE 50 MG/1
50 TABLET, FILM COATED ORAL
Status: COMPLETED | OUTPATIENT
Start: 2025-05-26 | End: 2025-05-26

## 2025-05-26 RX ADMIN — TRAMADOL HYDROCHLORIDE 50 MG: 50 TABLET, COATED ORAL at 09:05

## 2025-05-26 RX ADMIN — BACITRACIN: 500 OINTMENT TOPICAL at 09:05

## 2025-05-26 RX ADMIN — LIDOCAINE HYDROCHLORIDE 50 MG: 10 INJECTION, SOLUTION EPIDURAL; INFILTRATION; INTRACAUDAL at 08:05

## 2025-06-25 ENCOUNTER — LAB VISIT (OUTPATIENT)
Dept: LAB | Facility: HOSPITAL | Age: 75
End: 2025-06-25
Attending: INTERNAL MEDICINE
Payer: MEDICARE

## 2025-06-25 DIAGNOSIS — R80.9 PROTEINURIA, UNSPECIFIED TYPE: Primary | ICD-10-CM

## 2025-06-25 LAB
ABSOLUTE EOSINOPHIL (OHS): 0.14 K/UL
ABSOLUTE MONOCYTE (OHS): 0.31 K/UL (ref 0.3–1)
ABSOLUTE NEUTROPHIL COUNT (OHS): 2.53 K/UL (ref 1.8–7.7)
ALBUMIN SERPL BCP-MCNC: 3.4 G/DL (ref 3.5–5.2)
ANION GAP (OHS): 7 MMOL/L (ref 8–16)
BASOPHILS # BLD AUTO: 0.02 K/UL
BASOPHILS NFR BLD AUTO: 0.5 %
BUN SERPL-MCNC: 13 MG/DL (ref 7–17)
CALCIUM SERPL-MCNC: 9.1 MG/DL (ref 8.7–10.5)
CHLORIDE SERPL-SCNC: 105 MMOL/L (ref 95–110)
CO2 SERPL-SCNC: 29 MMOL/L (ref 23–29)
CREAT SERPL-MCNC: 0.8 MG/DL (ref 0.5–1.4)
ERYTHROCYTE [DISTWIDTH] IN BLOOD BY AUTOMATED COUNT: 13 % (ref 11.5–14.5)
GFR SERPLBLD CREATININE-BSD FMLA CKD-EPI: >60 ML/MIN/1.73/M2
GLUCOSE SERPL-MCNC: 120 MG/DL (ref 70–110)
HCT VFR BLD AUTO: 37 % (ref 37–48.5)
HGB BLD-MCNC: 12.7 GM/DL (ref 12–16)
IMM GRANULOCYTES # BLD AUTO: 0 K/UL (ref 0–0.04)
IMM GRANULOCYTES NFR BLD AUTO: 0 % (ref 0–0.5)
LYMPHOCYTES # BLD AUTO: 0.74 K/UL (ref 1–4.8)
MCH RBC QN AUTO: 32.1 PG (ref 27–31)
MCHC RBC AUTO-ENTMCNC: 34.3 G/DL (ref 32–36)
MCV RBC AUTO: 93 FL (ref 82–98)
NUCLEATED RBC (/100WBC) (OHS): 0 /100 WBC
PHOSPHATE SERPL-MCNC: 3.4 MG/DL (ref 2.7–4.5)
PLATELET # BLD AUTO: 80 K/UL (ref 150–450)
PMV BLD AUTO: 10.6 FL (ref 9.2–12.9)
POTASSIUM SERPL-SCNC: 3.9 MMOL/L (ref 3.5–5.1)
RBC # BLD AUTO: 3.96 M/UL (ref 4–5.4)
RELATIVE EOSINOPHIL (OHS): 3.7 %
RELATIVE LYMPHOCYTE (OHS): 19.8 % (ref 18–48)
RELATIVE MONOCYTE (OHS): 8.3 % (ref 4–15)
RELATIVE NEUTROPHIL (OHS): 67.7 % (ref 38–73)
SODIUM SERPL-SCNC: 141 MMOL/L (ref 136–145)
WBC # BLD AUTO: 3.74 K/UL (ref 3.9–12.7)

## 2025-06-25 PROCEDURE — 85025 COMPLETE CBC W/AUTO DIFF WBC: CPT | Mod: PN

## 2025-06-25 PROCEDURE — 36415 COLL VENOUS BLD VENIPUNCTURE: CPT | Mod: PN

## 2025-06-25 PROCEDURE — 80069 RENAL FUNCTION PANEL: CPT | Mod: PN

## 2025-06-30 DIAGNOSIS — K76.7 HEPATORENAL SYNDROME: Primary | ICD-10-CM

## 2025-06-30 DIAGNOSIS — I10 HYPERTENSION, UNSPECIFIED TYPE: ICD-10-CM
